# Patient Record
Sex: MALE | Race: WHITE | URBAN - METROPOLITAN AREA
[De-identification: names, ages, dates, MRNs, and addresses within clinical notes are randomized per-mention and may not be internally consistent; named-entity substitution may affect disease eponyms.]

---

## 2017-03-07 ENCOUNTER — OFFICE VISIT (OUTPATIENT)
Dept: INTERNAL MEDICINE CLINIC | Age: 47
End: 2017-03-07

## 2017-03-07 VITALS
RESPIRATION RATE: 14 BRPM | HEIGHT: 71 IN | DIASTOLIC BLOOD PRESSURE: 86 MMHG | OXYGEN SATURATION: 99 % | BODY MASS INDEX: 33.26 KG/M2 | TEMPERATURE: 97.9 F | WEIGHT: 237.6 LBS | HEART RATE: 65 BPM | SYSTOLIC BLOOD PRESSURE: 140 MMHG

## 2017-03-07 DIAGNOSIS — I10 ESSENTIAL HYPERTENSION: Primary | ICD-10-CM

## 2017-03-07 DIAGNOSIS — F98.8 ADD (ATTENTION DEFICIT DISORDER): ICD-10-CM

## 2017-03-07 PROCEDURE — 99214 OFFICE O/P EST MOD 30 MIN: CPT | Performed by: INTERNAL MEDICINE

## 2017-03-07 RX ORDER — DEXTROAMPHETAMINE SACCHARATE, AMPHETAMINE ASPARTATE MONOHYDRATE, DEXTROAMPHETAMINE SULFATE AND AMPHETAMINE SULFATE 6.25; 6.25; 6.25; 6.25 MG/1; MG/1; MG/1; MG/1
25 CAPSULE, EXTENDED RELEASE ORAL EVERY MORNING
Qty: 90 CAPSULE | Refills: 0 | Status: SHIPPED | OUTPATIENT
Start: 2017-03-07 | End: 2017-06-07 | Stop reason: SDUPTHER

## 2017-03-07 ASSESSMENT — ENCOUNTER SYMPTOMS
DIARRHEA: 0
SHORTNESS OF BREATH: 0
COUGH: 0
ABDOMINAL PAIN: 0
WHEEZING: 0
CONSTIPATION: 0

## 2017-06-07 ENCOUNTER — OFFICE VISIT (OUTPATIENT)
Dept: INTERNAL MEDICINE CLINIC | Age: 47
End: 2017-06-07

## 2017-06-07 VITALS
SYSTOLIC BLOOD PRESSURE: 136 MMHG | WEIGHT: 231.6 LBS | HEIGHT: 71 IN | OXYGEN SATURATION: 98 % | DIASTOLIC BLOOD PRESSURE: 82 MMHG | TEMPERATURE: 98 F | HEART RATE: 74 BPM | BODY MASS INDEX: 32.42 KG/M2 | RESPIRATION RATE: 14 BRPM

## 2017-06-07 DIAGNOSIS — F98.8 ADD (ATTENTION DEFICIT DISORDER): Primary | ICD-10-CM

## 2017-06-07 PROCEDURE — 99213 OFFICE O/P EST LOW 20 MIN: CPT | Performed by: INTERNAL MEDICINE

## 2017-06-07 RX ORDER — DEXTROAMPHETAMINE SACCHARATE, AMPHETAMINE ASPARTATE MONOHYDRATE, DEXTROAMPHETAMINE SULFATE AND AMPHETAMINE SULFATE 6.25; 6.25; 6.25; 6.25 MG/1; MG/1; MG/1; MG/1
25 CAPSULE, EXTENDED RELEASE ORAL EVERY MORNING
Qty: 90 CAPSULE | Refills: 0 | Status: SHIPPED | OUTPATIENT
Start: 2017-06-07 | End: 2018-01-15 | Stop reason: SDUPTHER

## 2017-06-07 ASSESSMENT — ENCOUNTER SYMPTOMS
CONSTIPATION: 0
WHEEZING: 0
SHORTNESS OF BREATH: 0
DIARRHEA: 0

## 2017-10-02 ENCOUNTER — TELEPHONE (OUTPATIENT)
Dept: INTERNAL MEDICINE CLINIC | Age: 47
End: 2017-10-02

## 2018-01-15 ENCOUNTER — OFFICE VISIT (OUTPATIENT)
Dept: INTERNAL MEDICINE CLINIC | Age: 48
End: 2018-01-15

## 2018-01-15 VITALS
WEIGHT: 245 LBS | BODY MASS INDEX: 34.3 KG/M2 | TEMPERATURE: 98.1 F | HEIGHT: 71 IN | SYSTOLIC BLOOD PRESSURE: 150 MMHG | DIASTOLIC BLOOD PRESSURE: 92 MMHG | HEART RATE: 76 BPM | OXYGEN SATURATION: 99 %

## 2018-01-15 DIAGNOSIS — R74.8 ELEVATED LIVER ENZYMES: ICD-10-CM

## 2018-01-15 DIAGNOSIS — Z00.00 ANNUAL PHYSICAL EXAM: Primary | ICD-10-CM

## 2018-01-15 DIAGNOSIS — E66.9 OBESITY (BMI 30.0-34.9): ICD-10-CM

## 2018-01-15 DIAGNOSIS — Z12.11 COLON CANCER SCREENING: ICD-10-CM

## 2018-01-15 DIAGNOSIS — Z23 FLU VACCINE NEED: ICD-10-CM

## 2018-01-15 DIAGNOSIS — I10 ESSENTIAL HYPERTENSION: ICD-10-CM

## 2018-01-15 DIAGNOSIS — G47.30 SLEEP APNEA, UNSPECIFIED TYPE: ICD-10-CM

## 2018-01-15 DIAGNOSIS — G56.01 CARPAL TUNNEL SYNDROME, RIGHT: ICD-10-CM

## 2018-01-15 DIAGNOSIS — Z12.5 PROSTATE CANCER SCREENING: ICD-10-CM

## 2018-01-15 DIAGNOSIS — F98.8 ATTENTION DEFICIT DISORDER (ADD) WITHOUT HYPERACTIVITY: ICD-10-CM

## 2018-01-15 PROBLEM — E66.811 OBESITY (BMI 30.0-34.9): Status: ACTIVE | Noted: 2018-01-15

## 2018-01-15 LAB
BILIRUBIN, POC: NORMAL
BLOOD URINE, POC: NORMAL
CLARITY, POC: CLEAR
COLOR, POC: YELLOW
GLUCOSE URINE, POC: NORMAL
KETONES, POC: NORMAL
LEUKOCYTE EST, POC: NORMAL
NITRITE, POC: NORMAL
PH, POC: 7
PROTEIN, POC: NORMAL
SPECIFIC GRAVITY, POC: 1.03
UROBILINOGEN, POC: 0.2

## 2018-01-15 PROCEDURE — 90472 IMMUNIZATION ADMIN EACH ADD: CPT | Performed by: INTERNAL MEDICINE

## 2018-01-15 PROCEDURE — 99396 PREV VISIT EST AGE 40-64: CPT | Performed by: INTERNAL MEDICINE

## 2018-01-15 PROCEDURE — 81002 URINALYSIS NONAUTO W/O SCOPE: CPT | Performed by: INTERNAL MEDICINE

## 2018-01-15 PROCEDURE — 90630 INFLUENZA, QUADV, 18-64 YRS, ID, PF, MICRO INJ, 0.1ML (FLUZONE QUADV, PF): CPT | Performed by: INTERNAL MEDICINE

## 2018-01-15 PROCEDURE — 90715 TDAP VACCINE 7 YRS/> IM: CPT | Performed by: INTERNAL MEDICINE

## 2018-01-15 PROCEDURE — 90471 IMMUNIZATION ADMIN: CPT | Performed by: INTERNAL MEDICINE

## 2018-01-15 RX ORDER — AMLODIPINE BESYLATE 5 MG/1
5 TABLET ORAL DAILY
Qty: 90 TABLET | Refills: 1 | Status: SHIPPED | OUTPATIENT
Start: 2018-01-15 | End: 2018-03-21

## 2018-01-15 RX ORDER — DEXTROAMPHETAMINE SACCHARATE, AMPHETAMINE ASPARTATE MONOHYDRATE, DEXTROAMPHETAMINE SULFATE AND AMPHETAMINE SULFATE 6.25; 6.25; 6.25; 6.25 MG/1; MG/1; MG/1; MG/1
25 CAPSULE, EXTENDED RELEASE ORAL EVERY MORNING
Qty: 90 CAPSULE | Refills: 0 | Status: SHIPPED | OUTPATIENT
Start: 2018-01-15 | End: 2018-04-18 | Stop reason: SDUPTHER

## 2018-01-15 RX ORDER — DEXTROAMPHETAMINE SACCHARATE, AMPHETAMINE ASPARTATE MONOHYDRATE, DEXTROAMPHETAMINE SULFATE AND AMPHETAMINE SULFATE 6.25; 6.25; 6.25; 6.25 MG/1; MG/1; MG/1; MG/1
25 CAPSULE, EXTENDED RELEASE ORAL EVERY MORNING
Qty: 90 CAPSULE | Refills: 0 | Status: SHIPPED | OUTPATIENT
Start: 2018-01-15 | End: 2018-01-15 | Stop reason: SDUPTHER

## 2018-01-15 ASSESSMENT — PATIENT HEALTH QUESTIONNAIRE - PHQ9
SUM OF ALL RESPONSES TO PHQ QUESTIONS 1-9: 0
1. LITTLE INTEREST OR PLEASURE IN DOING THINGS: 0
SUM OF ALL RESPONSES TO PHQ9 QUESTIONS 1 & 2: 0
2. FEELING DOWN, DEPRESSED OR HOPELESS: 0

## 2018-01-15 ASSESSMENT — ENCOUNTER SYMPTOMS
ABDOMINAL PAIN: 0
CONSTIPATION: 0
CHEST TIGHTNESS: 0
NAUSEA: 0
BACK PAIN: 0
SHORTNESS OF BREATH: 0
VOICE CHANGE: 0
VOMITING: 0
SINUS PRESSURE: 0
RHINORRHEA: 1
DIARRHEA: 0
ABDOMINAL DISTENTION: 0
EYE PAIN: 0
TROUBLE SWALLOWING: 0
COLOR CHANGE: 0
WHEEZING: 0
APNEA: 0
COUGH: 0

## 2018-01-15 NOTE — PROGRESS NOTES
Subjective:      Patient ID: Harleen Garibay is a 50 y.o. male. HPI     Chief Complaint   Patient presents with    Annual Exam      Drainage and sinus x 1 week or so  No fever/chills  No dyspnea  When singing deep breath will make him cough    Last ate 1.5 hours ago    Weight gain noted and discussed  Not exercising, not eating correctly  Wife had chemotherapy for breast cancer (pos nodes), over, just had surgery and then radiation following it    Not taken adderall for a few months  Takes the amlodipine intermittently - last rx was  for a 180 day supply      Current Outpatient Prescriptions   Medication Sig Dispense Refill    amphetamine-dextroamphetamine (ADDERALL XR) 25 MG extended release capsule Take 1 capsule by mouth every morning . 90 capsule 0    amLODIPine (NORVASC) 5 MG tablet Take 1 tablet by mouth daily 90 tablet 1     No current facility-administered medications for this visit. Allergies:  No Known Allergies  Prior Medical History:       Diagnosis Date    Family history of ischemic heart disease     Seizures ([address]     5 years ago, no recurrence     Social History  Social History   Substance Use Topics    Smoking status: Former Smoker     Quit date: 11/18/2011    Smokeless tobacco: Never Used    Alcohol use No     Prior Surgical History:  History reviewed. No pertinent surgical history. Family History:       Problem Relation Age of Onset    Cancer Mother      breast    Diabetes Mother     Stroke Mother     Heart Disease Father      MI at age 39         Review of Systems   Constitutional: Negative for activity change, appetite change, chills, fatigue, fever and unexpected weight change. HENT: Positive for congestion, postnasal drip and rhinorrhea. Negative for dental problem, hearing loss, sinus pressure, tinnitus, trouble swallowing and voice change. Eyes: Negative for pain and visual disturbance.         Last eye exam within the last year   Respiratory: Negative for

## 2018-01-16 ENCOUNTER — TELEPHONE (OUTPATIENT)
Dept: INTERNAL MEDICINE CLINIC | Age: 48
End: 2018-01-16

## 2018-01-16 LAB
A/G RATIO: 1.9 (ref 1.1–2.2)
ALBUMIN SERPL-MCNC: 4.4 G/DL (ref 3.4–5)
ALP BLD-CCNC: 45 U/L (ref 40–129)
ALT SERPL-CCNC: 41 U/L (ref 10–40)
ANION GAP SERPL CALCULATED.3IONS-SCNC: 15 MMOL/L (ref 3–16)
AST SERPL-CCNC: 24 U/L (ref 15–37)
BILIRUB SERPL-MCNC: <0.2 MG/DL (ref 0–1)
BUN BLDV-MCNC: 22 MG/DL (ref 7–20)
CALCIUM SERPL-MCNC: 9.3 MG/DL (ref 8.3–10.6)
CHLORIDE BLD-SCNC: 105 MMOL/L (ref 99–110)
CHOLESTEROL, TOTAL: 161 MG/DL (ref 0–199)
CO2: 25 MMOL/L (ref 21–32)
CREAT SERPL-MCNC: 0.8 MG/DL (ref 0.9–1.3)
GFR AFRICAN AMERICAN: >60
GFR NON-AFRICAN AMERICAN: >60
GLOBULIN: 2.3 G/DL
GLUCOSE BLD-MCNC: 98 MG/DL (ref 70–99)
HDLC SERPL-MCNC: 51 MG/DL (ref 40–60)
LDL CHOLESTEROL CALCULATED: 78 MG/DL
POTASSIUM SERPL-SCNC: 4.2 MMOL/L (ref 3.5–5.1)
PROSTATE SPECIFIC ANTIGEN: 0.56 NG/ML (ref 0–4)
SODIUM BLD-SCNC: 145 MMOL/L (ref 136–145)
TOTAL PROTEIN: 6.7 G/DL (ref 6.4–8.2)
TRIGL SERPL-MCNC: 158 MG/DL (ref 0–150)
VLDLC SERPL CALC-MCNC: 32 MG/DL

## 2018-04-18 ENCOUNTER — OFFICE VISIT (OUTPATIENT)
Dept: INTERNAL MEDICINE CLINIC | Age: 48
End: 2018-04-18

## 2018-04-18 VITALS
DIASTOLIC BLOOD PRESSURE: 84 MMHG | HEIGHT: 69 IN | WEIGHT: 234 LBS | HEART RATE: 90 BPM | SYSTOLIC BLOOD PRESSURE: 134 MMHG | BODY MASS INDEX: 34.66 KG/M2 | OXYGEN SATURATION: 98 %

## 2018-04-18 DIAGNOSIS — I10 ESSENTIAL HYPERTENSION: Primary | ICD-10-CM

## 2018-04-18 DIAGNOSIS — R74.8 ELEVATED LIVER ENZYMES: ICD-10-CM

## 2018-04-18 DIAGNOSIS — F98.8 ATTENTION DEFICIT DISORDER (ADD) WITHOUT HYPERACTIVITY: ICD-10-CM

## 2018-04-18 PROCEDURE — 99214 OFFICE O/P EST MOD 30 MIN: CPT | Performed by: INTERNAL MEDICINE

## 2018-04-18 RX ORDER — DEXTROAMPHETAMINE SACCHARATE, AMPHETAMINE ASPARTATE MONOHYDRATE, DEXTROAMPHETAMINE SULFATE AND AMPHETAMINE SULFATE 6.25; 6.25; 6.25; 6.25 MG/1; MG/1; MG/1; MG/1
25 CAPSULE, EXTENDED RELEASE ORAL EVERY MORNING
Qty: 90 CAPSULE | Refills: 0 | Status: SHIPPED | OUTPATIENT
Start: 2018-04-18 | End: 2018-07-12 | Stop reason: SDUPTHER

## 2018-04-18 ASSESSMENT — ENCOUNTER SYMPTOMS
COUGH: 0
SHORTNESS OF BREATH: 0
NAUSEA: 0
VOMITING: 0
WHEEZING: 0

## 2018-04-21 LAB
6-ACETYLMORPHINE: NOT DETECTED
7-AMINOCLONAZEPAM: NOT DETECTED
ALPHA-OH-ALPRAZOLAM: NOT DETECTED
ALPRAZOLAM: NOT DETECTED
AMPHETAMINE: PRESENT
BARBITURATES: NOT DETECTED
BENZOYLECGONINE: NOT DETECTED
BUPRENORPHINE: NOT DETECTED
CARISOPRODOL: NOT DETECTED
CLONAZEPAM: NOT DETECTED
CODEINE: NOT DETECTED
CREATININE URINE: 47.2 MG/DL (ref 20–400)
DIAZEPAM: NOT DETECTED
DRUGS EXPECTED: NORMAL
EER PAIN MGT DRUG PANEL, HIGH RES/EMIT U: NORMAL
ETHYL GLUCURONIDE: NOT DETECTED
FENTANYL: NOT DETECTED
HYDROCODONE: NOT DETECTED
HYDROMORPHONE: NOT DETECTED
LORAZEPAM: NOT DETECTED
MARIJUANA METABOLITE: NOT DETECTED
MDA: NOT DETECTED
MDEA: NOT DETECTED
MDMA URINE: NOT DETECTED
MEPERIDINE: NOT DETECTED
METHADONE: NOT DETECTED
METHAMPHETAMINE: NOT DETECTED
METHYLPHENIDATE: NOT DETECTED
MIDAZOLAM: NOT DETECTED
MORPHINE: NOT DETECTED
NORBUPRENORPHINE, FREE: NOT DETECTED
NORDIAZEPAM: NOT DETECTED
NORFENTANYL: NOT DETECTED
NORHYDROCODONE, URINE: NOT DETECTED
NOROXYCODONE: NOT DETECTED
NOROXYMORPHONE, URINE: NOT DETECTED
OXAZEPAM: NOT DETECTED
OXYCODONE: NOT DETECTED
OXYMORPHONE: NOT DETECTED
PAIN MANAGEMENT DRUG PANEL: NORMAL
PAIN MANAGEMENT DRUG PANEL: NORMAL
PCP: NOT DETECTED
PHENTERMINE: NOT DETECTED
PROPOXYPHENE: NOT DETECTED
TAPENTADOL, URINE: NOT DETECTED
TAPENTADOL-O-SULFATE, URINE: NOT DETECTED
TEMAZEPAM: NOT DETECTED
TRAMADOL: NOT DETECTED
ZOLPIDEM: NOT DETECTED

## 2018-07-03 ENCOUNTER — OFFICE VISIT (OUTPATIENT)
Dept: INTERNAL MEDICINE CLINIC | Age: 48
End: 2018-07-03

## 2018-07-03 VITALS
TEMPERATURE: 98.1 F | OXYGEN SATURATION: 98 % | WEIGHT: 232.2 LBS | DIASTOLIC BLOOD PRESSURE: 80 MMHG | SYSTOLIC BLOOD PRESSURE: 124 MMHG | HEART RATE: 72 BPM | BODY MASS INDEX: 34.29 KG/M2

## 2018-07-03 DIAGNOSIS — Z12.11 COLON CANCER SCREENING: ICD-10-CM

## 2018-07-03 DIAGNOSIS — I10 ESSENTIAL HYPERTENSION: ICD-10-CM

## 2018-07-03 DIAGNOSIS — E66.9 OBESITY (BMI 30.0-34.9): ICD-10-CM

## 2018-07-03 DIAGNOSIS — F98.8 ATTENTION DEFICIT DISORDER (ADD) WITHOUT HYPERACTIVITY: Primary | ICD-10-CM

## 2018-07-03 PROCEDURE — 99214 OFFICE O/P EST MOD 30 MIN: CPT | Performed by: INTERNAL MEDICINE

## 2018-07-03 ASSESSMENT — ENCOUNTER SYMPTOMS
WHEEZING: 0
CONSTIPATION: 0
DIARRHEA: 0
SHORTNESS OF BREATH: 0
ABDOMINAL PAIN: 0
COUGH: 0

## 2018-07-12 ENCOUNTER — TELEPHONE (OUTPATIENT)
Dept: INTERNAL MEDICINE CLINIC | Age: 48
End: 2018-07-12

## 2018-07-12 DIAGNOSIS — F98.8 ATTENTION DEFICIT DISORDER (ADD) WITHOUT HYPERACTIVITY: Primary | ICD-10-CM

## 2018-07-12 RX ORDER — DEXTROAMPHETAMINE SACCHARATE, AMPHETAMINE ASPARTATE MONOHYDRATE, DEXTROAMPHETAMINE SULFATE AND AMPHETAMINE SULFATE 6.25; 6.25; 6.25; 6.25 MG/1; MG/1; MG/1; MG/1
25 CAPSULE, EXTENDED RELEASE ORAL EVERY MORNING
Qty: 90 CAPSULE | Refills: 0 | Status: SHIPPED | OUTPATIENT
Start: 2018-07-12 | End: 2018-10-20

## 2018-07-12 RX ORDER — DEXTROAMPHETAMINE SACCHARATE, AMPHETAMINE ASPARTATE MONOHYDRATE, DEXTROAMPHETAMINE SULFATE AND AMPHETAMINE SULFATE 6.25; 6.25; 6.25; 6.25 MG/1; MG/1; MG/1; MG/1
25 CAPSULE, EXTENDED RELEASE ORAL EVERY MORNING
Qty: 90 CAPSULE | Refills: 0 | Status: SHIPPED | OUTPATIENT
Start: 2018-07-12 | End: 2018-07-12 | Stop reason: SDUPTHER

## 2018-07-12 NOTE — TELEPHONE ENCOUNTER
Patient needs a refill for adderall 25 extended release for 90 days sent to his mail a way   [phone]

## 2023-05-08 ENCOUNTER — OFFICE VISIT (OUTPATIENT)
Dept: CARDIOLOGY CLINIC | Age: 53
End: 2023-05-08

## 2023-05-08 VITALS
WEIGHT: 268 LBS | HEART RATE: 62 BPM | SYSTOLIC BLOOD PRESSURE: 128 MMHG | DIASTOLIC BLOOD PRESSURE: 72 MMHG | BODY MASS INDEX: 39.69 KG/M2 | HEIGHT: 69 IN

## 2023-05-08 DIAGNOSIS — Z00.00 ROUTINE CHECK-UP: Primary | ICD-10-CM

## 2023-05-08 DIAGNOSIS — R06.02 SOB (SHORTNESS OF BREATH): ICD-10-CM

## 2023-05-08 DIAGNOSIS — R07.9 CHEST PAIN, UNSPECIFIED TYPE: ICD-10-CM

## 2023-05-08 RX ORDER — AMLODIPINE BESYLATE 10 MG/1
10 TABLET ORAL DAILY
COMMUNITY
Start: 2023-04-20

## 2023-05-08 RX ORDER — ERGOCALCIFEROL 1.25 MG/1
50000 CAPSULE ORAL WEEKLY
COMMUNITY
Start: 2022-02-18

## 2023-05-08 NOTE — PROGRESS NOTES
enlarged  LUNGS:  Clear to auscultation and percussion  HEART and VASCULAR:  The apical impulses not displaced  Heart tones are crisp and normal  Cervical veins are not engorged  The carotid upstroke is normal in amplitude and contour without delay or bruit  Peripheral pulses are symmetrical and full  There is no clubbing, cyanosis of the extremities. No peripheral edema  Femoral Arteries: 2+ and equal  Pedal Pulses: 2+ and equal   ABDOMEN[de-identified]  No masses or tenderness  Liver/Spleen: No Abnormalities Noted  NEUROLOGICAL:  . Moves all extremities to command. Cranial nerves 2-12 are in tact. PSYCHIATRIC: alert and lucid  and oriented and appropriate  SKIN: No lesions or rashes  LYMPH NODES: none enlarged            CBC with Differential:    Lab Results   Component Value Date/Time    WBC 8.4 11/18/2010 03:15 PM    RBC 4.52 11/18/2010 03:15 PM    HGB 15.4 11/18/2010 03:15 PM    HCT 44.7 11/18/2010 03:15 PM     11/18/2010 03:15 PM    MCV 99.0 11/18/2010 03:15 PM    MCH 34.2 11/18/2010 03:15 PM    MCHC 34.6 11/18/2010 03:15 PM    RDW 13.9 11/18/2010 03:15 PM     BMP:    Lab Results   Component Value Date/Time     01/15/2018 02:59 PM    K 4.2 01/15/2018 02:59 PM     01/15/2018 02:59 PM    CO2 25 01/15/2018 02:59 PM    BUN 22 01/15/2018 02:59 PM    LABALBU 4.4 01/15/2018 02:59 PM    CREATININE 0.8 01/15/2018 02:59 PM    CALCIUM 9.3 01/15/2018 02:59 PM    GFRAA >60 01/15/2018 02:59 PM    GFRAA >60 11/27/2012 12:01 PM    LABGLOM >60 01/15/2018 02:59 PM     Uric Acid:  No components found for: URIC  PT/INR:  No results found for: PROTIME, INR  Last 3 Troponin:  No results found for: TROPONINI  FLP:    Lab Results   Component Value Date/Time    TRIG 158 01/15/2018 02:59 PM    HDL 51 01/15/2018 02:59 PM    HDL 61 11/18/2010 03:15 PM    LDLCALC 78 01/15/2018 02:59 PM    LABVLDL 32 01/15/2018 02:59 PM       EKG: EKG on 5/8/2023 sinus rhythm 75. Nonspecific ST and T wave changes. Hollowayjennifer Barry   echocardiogram  This patient

## 2023-05-22 ENCOUNTER — HOSPITAL ENCOUNTER (OUTPATIENT)
Dept: CT IMAGING | Age: 53
Discharge: HOME | End: 2023-05-22
Payer: COMMERCIAL

## 2023-05-22 VITALS — HEART RATE: 62 BPM | SYSTOLIC BLOOD PRESSURE: 164 MMHG | DIASTOLIC BLOOD PRESSURE: 87 MMHG

## 2023-05-22 DIAGNOSIS — R07.9 CHEST PAIN, UNSPECIFIED TYPE: ICD-10-CM

## 2023-05-22 DIAGNOSIS — R06.02 SOB (SHORTNESS OF BREATH): ICD-10-CM

## 2023-05-22 PROCEDURE — 75574 CT ANGIO HRT W/3D IMAGE: CPT

## 2023-05-22 PROCEDURE — 6360000004 HC RX CONTRAST MEDICATION: Performed by: INTERNAL MEDICINE

## 2023-05-22 RX ADMIN — IOPAMIDOL 80 ML: 755 INJECTION, SOLUTION INTRAVENOUS at 12:51

## 2023-05-22 NOTE — FLOWSHEET NOTE
Patient arrived alert and orientated x 4, ambulatory, denies pain. CTA Coronary study done without complication. Patient given  1  SL Nitro. See flow sheets for VS.  Patient has small emesis after contrast. Patient states nausea subsided, had snack and drink. No rash, or swelling, denies throat swelling or irritation. Patient was d/cd from CT. No Metoprolol was given.

## 2023-05-22 NOTE — DISCHARGE INSTRUCTIONS
[facility] for your medical care. During your examination, you were given a Beta Blocker called Metopropol or Lopressor to help slow down your heart rate. The dose of the medication you were given was n/a and 1 sublingual nitroglycerin. Although there are few side effects from this medication when given in small amounts (doses) it is important you follow a few important instructions:    Notify the doctor that ordered your test or go to the nearest emergency room if you experience any of the following:  difficulty breathing  dizziness  extreme fatigue  pounding or irregular heart beat    Continue your usual diet, increase fluids today. (Four 8 ounce glasses of water). 4.You may return back to your normal activity and routine tomorrow. Test results will be sent to your Physician.     If you take Actoplus Met, Avandamet, Glucophage, Glucophage XR, Glucovance, Metformin, Metaglip, Riomet, or Fortmet hold medication for 48 hours after procedure and resume

## 2023-05-24 RX ORDER — AMLODIPINE BESYLATE 10 MG/1
10 TABLET ORAL DAILY
Qty: 90 TABLET | Refills: 3 | Status: SHIPPED | OUTPATIENT
Start: 2023-05-24 | End: 2023-05-25 | Stop reason: SDUPTHER

## 2023-05-25 ENCOUNTER — OFFICE VISIT (OUTPATIENT)
Dept: CARDIOLOGY CLINIC | Age: 53
End: 2023-05-25
Payer: COMMERCIAL

## 2023-05-25 VITALS
BODY MASS INDEX: 39.69 KG/M2 | WEIGHT: 268 LBS | HEIGHT: 69 IN | DIASTOLIC BLOOD PRESSURE: 80 MMHG | SYSTOLIC BLOOD PRESSURE: 128 MMHG | HEART RATE: 60 BPM

## 2023-05-25 DIAGNOSIS — I10 ESSENTIAL HYPERTENSION: ICD-10-CM

## 2023-05-25 DIAGNOSIS — E78.5 HYPERLIPIDEMIA LDL GOAL <70: Primary | ICD-10-CM

## 2023-05-25 PROCEDURE — 3079F DIAST BP 80-89 MM HG: CPT | Performed by: INTERNAL MEDICINE

## 2023-05-25 PROCEDURE — 99214 OFFICE O/P EST MOD 30 MIN: CPT | Performed by: INTERNAL MEDICINE

## 2023-05-25 PROCEDURE — 3074F SYST BP LT 130 MM HG: CPT | Performed by: INTERNAL MEDICINE

## 2023-05-25 RX ORDER — AMLODIPINE BESYLATE 10 MG/1
10 TABLET ORAL DAILY
Qty: 90 TABLET | Refills: 3 | Status: SHIPPED | OUTPATIENT
Start: 2023-05-25

## 2023-05-25 RX ORDER — AMLODIPINE BESYLATE 10 MG/1
10 TABLET ORAL DAILY
Qty: 14 TABLET | Refills: 0 | Status: SHIPPED | OUTPATIENT
Start: 2023-05-25

## 2023-05-25 NOTE — PROGRESS NOTES
[facility]   Dr Kurtis Patel. Mary CLEARY, [facility]    Outpatient Follow Up Note    2023,11:24 AM  Subjective:   CHIEF COMPLAINT / HPI:  Follow Up secondary to hypertension and hyperlipidemia     Pascual Goodson is 52 y.o. male who presents today for a routine follow up. Here today on 2023 for evaluation follow-up of the above. Also for evaluation of lower laboratory assessments. He not having any chest pains in fact feels quite good. No other issues and no dyspnea and no peripheral edema. He did have labs performed which include an A1c of 4.8 and BUN of 23. LDL was 94 PSA 0.6. Today's examination is stable with blood pressure 120/80 pulse of 60 he weighs in at 268 pounds. We did perform a CT angiogram of his coronary CT was negative showed no evidence for coronary artery disease and the calcium level was 0      Coronary CT angiogram 2023 was negative with normal coronaries. Hypertension  Hyperlipidemia with LDL of 94 on current therapy     Past Medical History:    Past Medical History:   Diagnosis Date    Family history of ischemic heart disease     Seizures ([address]     5 years ago, no recurrence     Past Surgical History  No past surgical history on file. Social History:       Social History     Tobacco Use   Smoking Status Former    Types: Cigarettes    Quit date: 2011    Years since quittin.5   Smokeless Tobacco Never     Current Medications:  Prior to Visit Medications    Medication Sig Taking?  Authorizing Provider   amLODIPine (NORVASC) 10 MG tablet Take 1 tablet by mouth daily Yes Ayesha Montejo, APRN - CNP   sertraline (ZOLOFT) 50 MG tablet Take 1.5 tablets by mouth daily Yes Historical Provider, MD   ergocalciferol (ERGOCALCIFEROL) 1.25 MG (68520 UT) capsule Take 1 capsule by mouth once a week Yes Historical Provider, MD   amphetamine-dextroamphetamine (ADDERALL XR) 25 MG extended release capsule Take 1 capsule by mouth every morning for 100

## 2023-11-29 RX ORDER — AMLODIPINE BESYLATE 10 MG/1
10 TABLET ORAL DAILY
Qty: 90 TABLET | Refills: 1 | Status: CANCELLED | OUTPATIENT
Start: 2023-11-29

## 2023-11-29 NOTE — TELEPHONE ENCOUNTER
Patient's wife called and stated the patient has put in a request for medication through [facility]. She said the pharmacy stated they haven't received anything.   Call back to inform when medication has been sent [phone]

## 2023-11-29 NOTE — TELEPHONE ENCOUNTER
I Medication Refills:    amLODIPine (NORVASC) 10 MG tablet [[id number]]    Order Details  Dose: 10 mg Route: Oral Frequency: DAILY   Dispense Quantity: 90 tablet Refills: 3          Sig: Take 1 tablet by mouth daily   Patient has enough for 7 days.      Last Office Visit: 5/25/23    Next Office Visit: 5/22/24    [facility] [address] [place] [address],[address] [phone] - F [phone]  [address] [place] [address],[address] [place] [address], [address] [place] [address]  Phone: [phone]  Fax: [phone]

## 2023-12-01 RX ORDER — AMLODIPINE BESYLATE 10 MG/1
10 TABLET ORAL DAILY
Qty: 90 TABLET | Refills: 3 | Status: SHIPPED | OUTPATIENT
Start: 2023-12-01

## 2024-05-20 RX ORDER — AMLODIPINE BESYLATE 10 MG/1
10 TABLET ORAL DAILY
Qty: 14 TABLET | Refills: 0 | Status: SHIPPED | OUTPATIENT
Start: 2024-05-20

## 2024-05-20 NOTE — TELEPHONE ENCOUNTER
I Medication Refills:  amLODIPine (NORVASC) 10 MG tablet [[id number]]    Order Details  Dose: 10 mg Route: Oral Frequency: DAILY   Dispense Quantity: 90 tablet Refills: 3          Sig: Take 1 tablet by mouth daily     He has enough to get through to the next appt but will need it before because of the home delivery. He said you could also write him a script when he gets to the office visit and he can take it to a different pharmacy.    Last Office Visit: 5/25/23    Next Office Visit: 5/29/24    [facility] - [address] [phone] - F [phone] [[id number]]

## 2024-05-29 ENCOUNTER — OFFICE VISIT (OUTPATIENT)
Dept: CARDIOLOGY CLINIC | Age: 54
End: 2024-05-29
Payer: COMMERCIAL

## 2024-05-29 VITALS
HEIGHT: 69 IN | SYSTOLIC BLOOD PRESSURE: 132 MMHG | BODY MASS INDEX: 39.25 KG/M2 | WEIGHT: 265 LBS | DIASTOLIC BLOOD PRESSURE: 80 MMHG | HEART RATE: 103 BPM

## 2024-05-29 DIAGNOSIS — I10 ESSENTIAL HYPERTENSION: Primary | ICD-10-CM

## 2024-05-29 DIAGNOSIS — R06.02 SHORTNESS OF BREATH: ICD-10-CM

## 2024-05-29 DIAGNOSIS — I48.91 ATRIAL FIBRILLATION, UNSPECIFIED TYPE (HCC): ICD-10-CM

## 2024-05-29 DIAGNOSIS — I48.0 PAROXYSMAL ATRIAL FIBRILLATION (HCC): ICD-10-CM

## 2024-05-29 PROCEDURE — 3075F SYST BP GE 130 - 139MM HG: CPT | Performed by: NURSE PRACTITIONER

## 2024-05-29 PROCEDURE — 99215 OFFICE O/P EST HI 40 MIN: CPT | Performed by: NURSE PRACTITIONER

## 2024-05-29 PROCEDURE — 3079F DIAST BP 80-89 MM HG: CPT | Performed by: NURSE PRACTITIONER

## 2024-05-29 PROCEDURE — 93000 ELECTROCARDIOGRAM COMPLETE: CPT | Performed by: NURSE PRACTITIONER

## 2024-05-29 RX ORDER — DEXTROAMPHETAMINE SACCHARATE, AMPHETAMINE ASPARTATE, DEXTROAMPHETAMINE SULFATE AND AMPHETAMINE SULFATE 5; 5; 5; 5 MG/1; MG/1; MG/1; MG/1
20 TABLET ORAL DAILY
COMMUNITY

## 2024-05-29 NOTE — PROGRESS NOTES
Component Value Date    CREATININE 0.8 (L) 01/15/2018    BUN 22 (H) 01/15/2018     01/15/2018    K 4.2 01/15/2018     01/15/2018    CO2 25 01/15/2018     Lab Results   Component Value Date    TSH 0.79 11/18/2010     Lab Results   Component Value Date    WBC 8.4 11/18/2010    HGB 15.4 11/18/2010    HCT 44.7 11/18/2010    MCV 99.0 11/18/2010     11/18/2010     Lab Results   Component Value Date    TRIG 158 (H) 01/15/2018    TRIG 100 07/19/2016    TRIG 71 01/13/2016     Lab Results   Component Value Date    HDL 51 01/15/2018    HDL 65 (H) 07/19/2016    HDL 55 01/13/2016       Radiology Review:  Pertinent images / reports were reviewed as a part of this visit and reveals the following:    Assessment:     New found Afib ?   EKG rate 102   [id number] Score for Atrial Fibrillation Stroke Risk 1     5/22/23  1. Total calcium score 0.  2. Right coronary artery dominance.  3. Normal coronary arteries with no stenosis or significant atherosclerotic plaque.    HTN  WNL     Obesity  Body mass index is 39.13 kg/m².  Discussed diet and activity    ADD   On adderall Zoloft      YANI    wears CPAP     Plan:   VSS wt stable   EKG  afib (last two EKG  were NSR) new afib??   He states he has had irregular HR not sure what it was for years but never diagnosed    States drinks great a amt caffeine drinks / energy drinks   Does a great deal of decongestants recommend to use antihistamine   Discussed dehydration, alcohol, decongestant, stress, energy drinks  / recommend water   States YANI and wears CPAP   TTE /   new found afib        Coronary CT angiogram 5/2023 was negative with normal coronaries  YANI wears CPAP  Fu in 3-4 weeks   Discussed with Dr Ramirez:   Start metoprolol 25 mg BID   Eliquis 5mg BID   Fu iin approx 3 weeks   Wants to see Dr Hernandez next visit   May need DCCV in future    If you haven't had a visit with Dr. Hernandez in a year please make your next visit with him.         Lazara Condon

## 2024-06-04 ENCOUNTER — HOSPITAL ENCOUNTER (OUTPATIENT)
Age: 54
Discharge: HOME | End: 2024-06-06
Payer: COMMERCIAL

## 2024-06-04 VITALS
SYSTOLIC BLOOD PRESSURE: 132 MMHG | HEIGHT: 69 IN | DIASTOLIC BLOOD PRESSURE: 80 MMHG | BODY MASS INDEX: 39.25 KG/M2 | WEIGHT: 265 LBS

## 2024-06-04 DIAGNOSIS — R06.02 SHORTNESS OF BREATH: ICD-10-CM

## 2024-06-04 DIAGNOSIS — I48.91 ATRIAL FIBRILLATION, UNSPECIFIED TYPE (HCC): ICD-10-CM

## 2024-06-04 DIAGNOSIS — I48.0 PAROXYSMAL ATRIAL FIBRILLATION (HCC): ICD-10-CM

## 2024-06-04 DIAGNOSIS — I10 ESSENTIAL HYPERTENSION: ICD-10-CM

## 2024-06-04 LAB
ECHO AV MEAN GRADIENT: 5 MMHG
ECHO AV MEAN VELOCITY: 1.1 M/S
ECHO AV PEAK GRADIENT: 8 MMHG
ECHO AV PEAK VELOCITY: 1.4 M/S
ECHO AV VELOCITY RATIO: 0.79
ECHO AV VTI: 32.8 CM
ECHO BSA: 2.42 M2
ECHO LA AREA 2C: 23.6 CM2
ECHO LA AREA 4C: 22.7 CM2
ECHO LA MAJOR AXIS: 5.6 CM
ECHO LA MINOR AXIS: 5.6 CM
ECHO LA VOL BP: 77 ML (ref 18–58)
ECHO LA VOL MOD A2C: 80 ML (ref 18–58)
ECHO LA VOL MOD A4C: 74 ML (ref 18–58)
ECHO LA VOL/BSA BIPLANE: 33 ML/M2 (ref 16–34)
ECHO LA VOLUME INDEX MOD A2C: 34 ML/M2 (ref 16–34)
ECHO LA VOLUME INDEX MOD A4C: 32 ML/M2 (ref 16–34)
ECHO LV E' LATERAL VELOCITY: 10 CM/S
ECHO LV E' SEPTAL VELOCITY: 7 CM/S
ECHO LV FRACTIONAL SHORTENING: 29 % (ref 28–44)
ECHO LV INTERNAL DIMENSION DIASTOLE INDEX: 2.1 CM/M2
ECHO LV INTERNAL DIMENSION DIASTOLIC: 4.9 CM (ref 4.2–5.9)
ECHO LV INTERNAL DIMENSION SYSTOLIC INDEX: 1.5 CM/M2
ECHO LV INTERNAL DIMENSION SYSTOLIC: 3.5 CM
ECHO LV IVSD: 1 CM (ref 0.6–1)
ECHO LV MASS 2D: 176 G (ref 88–224)
ECHO LV MASS INDEX 2D: 75.6 G/M2 (ref 49–115)
ECHO LV POSTERIOR WALL DIASTOLIC: 1 CM (ref 0.6–1)
ECHO LV RELATIVE WALL THICKNESS RATIO: 0.41
ECHO LVOT AV VTI INDEX: 0.77
ECHO LVOT MEAN GRADIENT: 3 MMHG
ECHO LVOT PEAK GRADIENT: 4 MMHG
ECHO LVOT PEAK VELOCITY: 1.1 M/S
ECHO LVOT VTI: 25.4 CM
ECHO MV A VELOCITY: 0.54 M/S
ECHO MV E VELOCITY: 0.73 M/S
ECHO MV E/A RATIO: 1.35
ECHO MV E/E' LATERAL: 7.3
ECHO MV E/E' RATIO (AVERAGED): 8.86
ECHO MV E/E' SEPTAL: 10.43
ECHO MV LVOT VTI INDEX: 1.05
ECHO MV MAX VELOCITY: 0.9 M/S
ECHO MV MEAN GRADIENT: 1 MMHG
ECHO MV MEAN VELOCITY: 0.5 M/S
ECHO MV PEAK GRADIENT: 3 MMHG
ECHO MV VTI: 26.7 CM
ECHO PV MAX VELOCITY: 1.1 M/S
ECHO PV MEAN GRADIENT: 2 MMHG
ECHO PV MEAN VELOCITY: 0.7 M/S
ECHO PV PEAK GRADIENT: 5 MMHG
ECHO PV VTI: 19.7 CM
ECHO RV INTERNAL DIMENSION: 3.6 CM
ECHO RV TAPSE: 3.1 CM (ref 1.7–?)

## 2024-06-04 PROCEDURE — 93306 TTE W/DOPPLER COMPLETE: CPT

## 2024-06-05 LAB
25(OH)D3 SERPL-MCNC: 21 NG/ML
ALBUMIN SERPL-MCNC: 4.4 G/DL (ref 3.4–5)
ALBUMIN/GLOB SERPL: 1.6 {RATIO} (ref 1.1–2.2)
ALP SERPL-CCNC: 64 U/L (ref 40–129)
ALT SERPL-CCNC: 57 U/L (ref 10–40)
ANION GAP SERPL CALCULATED.3IONS-SCNC: 14 MMOL/L (ref 3–16)
AST SERPL-CCNC: 27 U/L (ref 15–37)
BILIRUB DIRECT SERPL-MCNC: <0.2 MG/DL (ref 0–0.3)
BILIRUB INDIRECT SERPL-MCNC: ABNORMAL MG/DL (ref 0–1)
BILIRUB SERPL-MCNC: 0.4 MG/DL (ref 0–1)
BUN SERPL-MCNC: 18 MG/DL (ref 7–20)
CALCIUM SERPL-MCNC: 9.4 MG/DL (ref 8.3–10.6)
CHLORIDE SERPL-SCNC: 106 MMOL/L (ref 99–110)
CHOLEST SERPL-MCNC: 194 MG/DL (ref 0–199)
CO2 SERPL-SCNC: 23 MMOL/L (ref 21–32)
CREAT SERPL-MCNC: 0.9 MG/DL (ref 0.9–1.3)
DEPRECATED RDW RBC AUTO: 14 % (ref 12.4–15.4)
EST. AVERAGE GLUCOSE BLD GHB EST-MCNC: 88.2 MG/DL
GFR SERPLBLD CREATININE-BSD FMLA CKD-EPI: >90 ML/MIN/{1.73_M2}
GLUCOSE SERPL-MCNC: 87 MG/DL (ref 70–99)
HBA1C MFR BLD: 4.7 %
HCT VFR BLD AUTO: 43.5 % (ref 40.5–52.5)
HGB BLD-MCNC: 14.8 G/DL (ref 13.5–17.5)
LDLC SERPL CALC-MCNC: 126 MG/DL
MAGNESIUM SERPL-MCNC: 2.3 MG/DL (ref 1.8–2.4)
MCH RBC QN AUTO: 32.6 PG (ref 26–34)
MCHC RBC AUTO-ENTMCNC: 34.1 G/DL (ref 31–36)
PLATELET # BLD AUTO: 206 K/UL (ref 135–450)
PMV BLD AUTO: 9.2 FL (ref 5–10.5)
POTASSIUM SERPL-SCNC: 4.2 MMOL/L (ref 3.5–5.1)
PROT SERPL-MCNC: 7.1 G/DL (ref 6.4–8.2)
RBC # BLD AUTO: 4.55 M/UL (ref 4.2–5.9)
SODIUM SERPL-SCNC: 143 MMOL/L (ref 136–145)
TRIGL SERPL-MCNC: 76 MG/DL (ref 0–150)
TSH SERPL DL<=0.005 MIU/L-ACNC: 1.17 UIU/ML (ref 0.27–4.2)
VLDLC SERPL CALC-MCNC: 15 MG/DL
WBC # BLD AUTO: 8 K/UL (ref 4–11)

## 2024-06-20 ENCOUNTER — OFFICE VISIT (OUTPATIENT)
Dept: CARDIOLOGY CLINIC | Age: 54
End: 2024-06-20
Payer: COMMERCIAL

## 2024-06-20 ENCOUNTER — TELEPHONE (OUTPATIENT)
Dept: CARDIOLOGY CLINIC | Age: 54
End: 2024-06-20

## 2024-06-20 VITALS
HEART RATE: 80 BPM | BODY MASS INDEX: 40.46 KG/M2 | DIASTOLIC BLOOD PRESSURE: 82 MMHG | SYSTOLIC BLOOD PRESSURE: 136 MMHG | WEIGHT: 274 LBS

## 2024-06-20 DIAGNOSIS — I48.91 ATRIAL FIBRILLATION, UNSPECIFIED TYPE (HCC): ICD-10-CM

## 2024-06-20 DIAGNOSIS — E78.5 HYPERLIPIDEMIA LDL GOAL <70: ICD-10-CM

## 2024-06-20 DIAGNOSIS — Z00.00 ROUTINE CHECK-UP: ICD-10-CM

## 2024-06-20 DIAGNOSIS — I10 ESSENTIAL HYPERTENSION: Primary | ICD-10-CM

## 2024-06-20 DIAGNOSIS — I48.0 PAROXYSMAL ATRIAL FIBRILLATION (HCC): ICD-10-CM

## 2024-06-20 PROCEDURE — 3079F DIAST BP 80-89 MM HG: CPT | Performed by: INTERNAL MEDICINE

## 2024-06-20 PROCEDURE — 3075F SYST BP GE 130 - 139MM HG: CPT | Performed by: INTERNAL MEDICINE

## 2024-06-20 PROCEDURE — 99214 OFFICE O/P EST MOD 30 MIN: CPT | Performed by: INTERNAL MEDICINE

## 2024-06-20 PROCEDURE — 93000 ELECTROCARDIOGRAM COMPLETE: CPT | Performed by: INTERNAL MEDICINE

## 2024-06-20 RX ORDER — ATORVASTATIN CALCIUM 40 MG/1
40 TABLET, FILM COATED ORAL DAILY
Qty: 90 TABLET | Refills: 1 | Status: SHIPPED | OUTPATIENT
Start: 2024-06-20

## 2024-06-20 RX ORDER — ATORVASTATIN CALCIUM 40 MG/1
40 TABLET, FILM COATED ORAL DAILY
COMMUNITY

## 2024-06-26 NOTE — TELEPHONE ENCOUNTER
Called and spoke to Theo to let him know that I mailed another holter out to him on June 24,2024.

## 2024-06-26 NOTE — TELEPHONE ENCOUNTER
Patients partner Leena called asking if another monitor could be mailed to their house or if they should schedule another monitor appt since the pt's monitor fell off after wearing it for two days. Can reach Leena at [phone]

## 2024-07-12 NOTE — PRE-PROCEDURE INSTRUCTIONS
Called patient about procedure.Told to be here at 1130 for procedure at 1300. NPO after midnight, but can take morning medication with sips of water, patient stated they take Eliquis and have not missed any doses in the last 30 days. To have a responsible adult be with patient take them home and stay with them afterwards, if they do not get admitted to hosptial. And if available bring current list of medications. No other questions or concerns.

## 2024-07-15 ENCOUNTER — HOSPITAL ENCOUNTER (OUTPATIENT)
Dept: INTERVENTIONAL RADIOLOGY/VASCULAR | Age: 54
Discharge: HOME | End: 2024-07-17
Attending: INTERNAL MEDICINE
Payer: COMMERCIAL

## 2024-07-15 ENCOUNTER — ANESTHESIA (OUTPATIENT)
Dept: INTERVENTIONAL RADIOLOGY/VASCULAR | Age: 54
End: 2024-07-15
Payer: COMMERCIAL

## 2024-07-15 ENCOUNTER — ANESTHESIA EVENT (OUTPATIENT)
Dept: INTERVENTIONAL RADIOLOGY/VASCULAR | Age: 54
End: 2024-07-15
Payer: COMMERCIAL

## 2024-07-15 VITALS
WEIGHT: 270 LBS | HEART RATE: 91 BPM | SYSTOLIC BLOOD PRESSURE: 131 MMHG | OXYGEN SATURATION: 96 % | BODY MASS INDEX: 39.99 KG/M2 | DIASTOLIC BLOOD PRESSURE: 102 MMHG | RESPIRATION RATE: 20 BRPM | HEIGHT: 69 IN | TEMPERATURE: 98.2 F

## 2024-07-15 DIAGNOSIS — I48.91 ATRIAL FIBRILLATION, UNSPECIFIED TYPE (HCC): ICD-10-CM

## 2024-07-15 DIAGNOSIS — I48.0 PAROXYSMAL ATRIAL FIBRILLATION (HCC): ICD-10-CM

## 2024-07-15 LAB
ALBUMIN SERPL-MCNC: 4.1 G/DL (ref 3.4–5)
ALBUMIN/GLOB SERPL: 1.5 {RATIO} (ref 1.1–2.2)
ALP SERPL-CCNC: 65 U/L (ref 40–129)
ALT SERPL-CCNC: 83 U/L (ref 10–40)
ANION GAP SERPL CALCULATED.3IONS-SCNC: 10 MMOL/L (ref 3–16)
AST SERPL-CCNC: 42 U/L (ref 15–37)
BILIRUB SERPL-MCNC: 0.6 MG/DL (ref 0–1)
BUN SERPL-MCNC: 17 MG/DL (ref 7–20)
CALCIUM SERPL-MCNC: 8.8 MG/DL (ref 8.3–10.6)
CHLORIDE SERPL-SCNC: 110 MMOL/L (ref 99–110)
CO2 SERPL-SCNC: 22 MMOL/L (ref 21–32)
CREAT SERPL-MCNC: 1.1 MG/DL (ref 0.9–1.3)
ECHO BSA: 2.44 M2
EKG DIAGNOSIS: NORMAL
EKG DIAGNOSIS: NORMAL
EKG Q-T INTERVAL: 372 MS
EKG Q-T INTERVAL: 384 MS
EKG QRS DURATION: 80 MS
EKG QRS DURATION: 82 MS
EKG QTC CALCULATION (BAZETT): 439 MS
EKG QTC CALCULATION (BAZETT): 459 MS
EKG R AXIS: 15 DEGREES
EKG R AXIS: 35 DEGREES
EKG T AXIS: 37 DEGREES
EKG T AXIS: 43 DEGREES
EKG VENTRICULAR RATE: 84 BPM
EKG VENTRICULAR RATE: 86 BPM
GFR SERPLBLD CREATININE-BSD FMLA CKD-EPI: 79 ML/MIN/{1.73_M2}
GLUCOSE SERPL-MCNC: 105 MG/DL (ref 70–99)
INR PPP: 1.26 (ref 0.85–1.15)
POTASSIUM SERPL-SCNC: 4.4 MMOL/L (ref 3.5–5.1)
PROT SERPL-MCNC: 6.9 G/DL (ref 6.4–8.2)
PROTHROMBIN TIME: 16 SEC (ref 11.9–14.9)
SODIUM SERPL-SCNC: 142 MMOL/L (ref 136–145)

## 2024-07-15 PROCEDURE — 6360000002 HC RX W HCPCS

## 2024-07-15 PROCEDURE — 85610 PROTHROMBIN TIME: CPT

## 2024-07-15 PROCEDURE — 7100000011 HC PHASE II RECOVERY - ADDTL 15 MIN

## 2024-07-15 PROCEDURE — 3700000001 HC ADD 15 MINUTES (ANESTHESIA)

## 2024-07-15 PROCEDURE — 92961 CARDIOVERSION ELECTRIC INT: CPT

## 2024-07-15 PROCEDURE — 7100000010 HC PHASE II RECOVERY - FIRST 15 MIN

## 2024-07-15 PROCEDURE — 80053 COMPREHEN METABOLIC PANEL: CPT

## 2024-07-15 PROCEDURE — 2580000003 HC RX 258

## 2024-07-15 PROCEDURE — 3700000000 HC ANESTHESIA ATTENDED CARE

## 2024-07-15 PROCEDURE — 2500000003 HC RX 250 WO HCPCS

## 2024-07-15 PROCEDURE — 93010 ELECTROCARDIOGRAM REPORT: CPT | Performed by: INTERNAL MEDICINE

## 2024-07-15 PROCEDURE — 92961 CARDIOVERSION ELECTRIC INT: CPT | Performed by: INTERNAL MEDICINE

## 2024-07-15 PROCEDURE — 93005 ELECTROCARDIOGRAM TRACING: CPT | Performed by: INTERNAL MEDICINE

## 2024-07-15 RX ORDER — LIDOCAINE HYDROCHLORIDE 20 MG/ML
INJECTION, SOLUTION INFILTRATION; PERINEURAL PRN
Status: DISCONTINUED | OUTPATIENT
Start: 2024-07-15 | End: 2024-07-15 | Stop reason: SDUPTHER

## 2024-07-15 RX ORDER — PROPOFOL 10 MG/ML
INJECTION, EMULSION INTRAVENOUS PRN
Status: DISCONTINUED | OUTPATIENT
Start: 2024-07-15 | End: 2024-07-15 | Stop reason: SDUPTHER

## 2024-07-15 RX ORDER — SODIUM CHLORIDE, SODIUM LACTATE, POTASSIUM CHLORIDE, CALCIUM CHLORIDE 600; 310; 30; 20 MG/100ML; MG/100ML; MG/100ML; MG/100ML
INJECTION, SOLUTION INTRAVENOUS CONTINUOUS PRN
Status: DISCONTINUED | OUTPATIENT
Start: 2024-07-15 | End: 2024-07-15 | Stop reason: SDUPTHER

## 2024-07-15 RX ADMIN — PROPOFOL 40 MG: 10 INJECTION, EMULSION INTRAVENOUS at 13:26

## 2024-07-15 RX ADMIN — LIDOCAINE HYDROCHLORIDE 100 MG: 20 INJECTION, SOLUTION INFILTRATION; PERINEURAL at 13:26

## 2024-07-15 RX ADMIN — SODIUM CHLORIDE, SODIUM LACTATE, POTASSIUM CHLORIDE, AND CALCIUM CHLORIDE: .6; .31; .03; .02 INJECTION, SOLUTION INTRAVENOUS at 13:19

## 2024-07-15 RX ADMIN — PROPOFOL 40 MG: 10 INJECTION, EMULSION INTRAVENOUS at 13:28

## 2024-07-15 RX ADMIN — PROPOFOL 20 MG: 10 INJECTION, EMULSION INTRAVENOUS at 13:32

## 2024-07-15 RX ADMIN — PROPOFOL 20 MG: 10 INJECTION, EMULSION INTRAVENOUS at 13:30

## 2024-07-15 ASSESSMENT — LIFESTYLE VARIABLES: SMOKING_STATUS: 0

## 2024-07-15 NOTE — ANESTHESIA PRE PROCEDURE
Essential hypertension I10    Sleep apnea G47.30    Elevated liver enzymes R74.8    Attention deficit disorder (ADD) without hyperactivity F98.8    Carpal tunnel syndrome, right G56.01    Obesity (BMI 30.0-34.9) E66.9       Past Medical History:        Diagnosis Date    Family history of ischemic heart disease     Seizures (HCC)     5 years ago, no recurrence       Past Surgical History:  No past surgical history on file.    Social History:    Social History     Tobacco Use    Smoking status: Former     Current packs/day: 0.00     Types: Cigarettes     Quit date: 2011     Years since quittin.6    Smokeless tobacco: Never   Substance Use Topics    Alcohol use: No     Alcohol/week: 4.0 standard drinks of alcohol     Types: 4 Standard drinks or equivalent per week                                Counseling given: Not Answered      Vital Signs (Current): There were no vitals filed for this visit.                                           BP Readings from Last 3 Encounters:   24 136/82   24 132/80   24 132/80       NPO Status:                                                                                 BMI:   Wt Readings from Last 3 Encounters:   24 124.3 kg (274 lb)   24 120.2 kg (265 lb)   24 120.2 kg (265 lb)     There is no height or weight on file to calculate BMI.    CBC:   Lab Results   Component Value Date/Time    WBC 8.0 2024 02:29 PM    RBC 4.55 2024 02:29 PM    HGB 14.8 2024 02:29 PM    HCT 43.5 2024 02:29 PM    MCV 95.6 2024 02:29 PM    RDW 14.0 2024 02:29 PM     2024 02:29 PM       CMP:   Lab Results   Component Value Date/Time     2024 02:29 PM    K 4.2 2024 02:29 PM     2024 02:29 PM    CO2 23 2024 02:29 PM    BUN 18 2024 02:29 PM    CREATININE 0.9 2024 02:29 PM    GFRAA >60 01/15/2018 02:59 PM    GFRAA >60 2012 12:01 PM    AGRATIO 1.6 2024 02:29 PM

## 2024-07-15 NOTE — DISCHARGE INSTRUCTIONS
[facility]  Cardiovascular Special Procedures  Cardioversion  Patient Discharge Instructions      No driving for 24 hours.  We strongly recommend that a responsible adult stay with you for the next 24 hours.    Continue Eliquis as directed by Dr. Hernandez    Hydrocortisone 1% cream to reddened areas as needed.         [facility]  Cardiovascular Special Procedures  General Discharge Instructions    PROCEDURE: Synchronized Cardioversion    _X___ You may be drowsy or lightheaded after receiving sedation. DO NOT operate a vehicle (automobile, bicycle, motorcycle, machinery, or power tools), make any important decisions or sign any important/legal documents, or drink alcoholic beverages for the next 24 hours  _X___ We strongly suggest that a responsible adult be with you for the next 24 hours for your protection and safety  __X__ If the intravenous catheter site is painful, apply warm wet compresses on the site until the soreness is relieved and elevate the arm above the heart.  Call your physician if no improvement in 2 to 3 days    DIETARY INSTRUCTIONS:    ____ Drink extra fluids over the next 24 hours (If not contraindicated by illness or by physician order)  ____ Start with clear liquids and progress to normal diet as you feel like eating.  If you experience nausea or repeated episodes of vomiting, which persist beyond 12-24 hours, notify your doctor        __X__ Resume your previous diet    ACTIVITY INSTRUCTIONS:    ____ See other instructions  ____ No special instructions  __X__ Rest for 24 hours    ____ Up as tolerated  ____ Increase activity as tolerated    Wound/Dressing Instructions:  ____ See other instructions  ____ May shower, tomorrow  ____ Remove bandage within 24 hours    MEDICATION INSTRUCTIONS:    ____ See Medication Reconciliation Sheet      SPECIAL

## 2024-07-15 NOTE — ANESTHESIA POSTPROCEDURE EVALUATION
Department of Anesthesiology  Postprocedure Note    Patient: Theo Tariq  MRN: [id number]  YOB: 1970  Date of evaluation: 7/15/2024    Procedure Summary       Date: 07/15/24 Room / Location: [facility] Special Procedures    Anesthesia Start: 1319 Anesthesia Stop: 1335    Procedure: CARDIOVERSION INTERNAL Diagnosis:       Atrial fibrillation, unspecified type (HCC)      Paroxysmal atrial fibrillation (HCC)      (Afib)    Scheduled Providers: Shyam Hernandez MD; Colt Joseph DO Responsible Provider: Colt Joseph DO    Anesthesia Type: MAC ASA Status: 2            Anesthesia Type: MAC    Malorie Phase I: Malorie Score: 10    Malorie Phase II:      Vitals:    07/15/24 1336   BP: (!) 156/95   Pulse:    Resp:    Temp:    SpO2:        Anesthesia Post Evaluation    Patient location during evaluation: bedside  Patient participation: complete - patient participated  Level of consciousness: awake and awake and alert  Pain score: 0  Airway patency: patent  Nausea & Vomiting: no nausea and no vomiting  Cardiovascular status: hemodynamically stable  Respiratory status: acceptable  Hydration status: euvolemic  Pain management: adequate and satisfactory to patient    No notable events documented.

## 2024-07-23 ENCOUNTER — OFFICE VISIT (OUTPATIENT)
Dept: CARDIOLOGY CLINIC | Age: 54
End: 2024-07-23
Payer: COMMERCIAL

## 2024-07-23 VITALS
HEART RATE: 87 BPM | HEIGHT: 69 IN | SYSTOLIC BLOOD PRESSURE: 126 MMHG | BODY MASS INDEX: 41.32 KG/M2 | WEIGHT: 279 LBS | DIASTOLIC BLOOD PRESSURE: 82 MMHG

## 2024-07-23 DIAGNOSIS — I48.91 ATRIAL FIBRILLATION, UNSPECIFIED TYPE (HCC): Primary | ICD-10-CM

## 2024-07-23 PROCEDURE — 93000 ELECTROCARDIOGRAM COMPLETE: CPT | Performed by: INTERNAL MEDICINE

## 2024-07-23 PROCEDURE — 3074F SYST BP LT 130 MM HG: CPT | Performed by: INTERNAL MEDICINE

## 2024-07-23 PROCEDURE — 99214 OFFICE O/P EST MOD 30 MIN: CPT | Performed by: INTERNAL MEDICINE

## 2024-07-23 PROCEDURE — 3079F DIAST BP 80-89 MM HG: CPT | Performed by: INTERNAL MEDICINE

## 2024-07-23 RX ORDER — FLECAINIDE ACETATE 100 MG/1
100 TABLET ORAL 2 TIMES DAILY
Qty: 60 TABLET | Refills: 1 | Status: SHIPPED | OUTPATIENT
Start: 2024-07-23

## 2024-07-23 NOTE — PROGRESS NOTES
07/15/24 (!) 131/102   06/20/24 136/82     Pulse Readings from Last 3 Encounters:   07/23/24 87   07/15/24 91   06/20/24 80       CONSTITUTIONAL: Cooperative, no apparent distress, and appears well nourished / developed  NEUROLOGIC:  Awake and orientated to person, place and time.  PSYCH: Calm affect.  SKIN: Warm and dry.  HEENT: Sclera non-icteric, normocephalic, neck supple, no elevation of JVP, normal carotid pulses with no bruits and thyroid normal size.  LUNGS:  No increased work of breathing and clear to auscultation, no crackles or wheezing  CARDIOVASCULAR:  Regular rate and rhythm with no murmurs, gallops, rubs, or abnormal heart sounds, normal PMI.The apical impulses not displaced  Heart tones are crisp and normal  Cervical veins are not engorged  The carotid upstroke is normal in amplitude and contour without delay or bruit  JVP is not elevated  ABDOMEN:  Normal bowel sounds, non-distended and non-tender to palpation  EXT: No edema, no calf tenderness. Pulses are present bilaterally.    DATA:    Lab Results   Component Value Date    ALT 83 (H) 07/15/2024    AST 42 (H) 07/15/2024    ALKPHOS 65 07/15/2024    BILITOT 0.6 07/15/2024     Lab Results   Component Value Date    CREATININE 1.1 07/15/2024    BUN 17 07/15/2024     07/15/2024    K 4.4 07/15/2024     07/15/2024    CO2 22 07/15/2024     Lab Results   Component Value Date    TSH 0.79 11/18/2010     Lab Results   Component Value Date    WBC 8.0 06/04/2024    HGB 14.8 06/04/2024    HCT 43.5 06/04/2024    MCV 95.6 06/04/2024     06/04/2024     No components found for: \"CHLPL\"  Lab Results   Component Value Date    TRIG 76 06/04/2024    TRIG 158 (H) 01/15/2018    TRIG 100 07/19/2016     Lab Results   Component Value Date    HDL 53 06/04/2024    HDL 51 01/15/2018    HDL 65 (H) 07/19/2016     No components found for: \"LDLCALC\"    No components found for: \"LABVLDL\"    Radiology Review:  Pertinent images / reports were reviewed as a part of

## 2024-08-07 NOTE — PROGRESS NOTES
[facility]   Cardiac Electrophysiology Consultation   Date: 8/20/2024  Reason for Consultation:   Consult Requesting Physician: No att. providers found     Chief Complaint:   Chief Complaint   Patient presents with    New Patient     New pt per Dr. SOFIA possible ablation       HPI: Theo Tariq is a 54 y.o. male referred by Dr. Hernandez for consideration of ablation. PMH significant for seizures, HTN, HLD, YANI, obesity, cardiac CTA (5/2023) showed normal coronaries, reported irregular heartbeats for years, new onset AF (5/2024), placed on metoprolol and Eliquis, echo (6/2024) showed preserved LVEF with LAE, 7 day CAM (6/2024) showed 100% AF RVR, s/p DCCV with ERAF within several min (7/15/24), placed on flecainide by Dr. Hernandez.     Interval History:   Today, he is here with his significant other for rhythm management, presenting in AF/AFL CVR. He complains of exhaustion, palpitations, and RIVERA, but states that he has been feeling a little better over the past couple weeks. In the past year, he has gained a lot of weight d/t overeating. He is compliant with CPAP, even using it for naps. He has family h/o of CAD, but no known AF.    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    Assessment:  Newly diagnosed persistent AF, on Eliquis, flecainide, Lopressor  LAE, AF likely going on for some time  HTN, stable on Lopressor  HLD, on statin  YANI, compliant with CPAP  Obesity, morbid    Plan:  Due to symptomatic, persistent AF despite flecainide and LA remodeling, I do recommend rhythm management with AF ablation  No changes with current medications  Optimize risk factors for AF including continued treatment of YANI, BP control, weight loss, healthy diet, routine exercise, and stress management.  Follow up in 1 week with EP NP after the ablation  Follow up in 3 months with me    Discussed in detail about the risk factors, pathophysiology, and treatment options including life stye modifications

## 2024-08-20 ENCOUNTER — OFFICE VISIT (OUTPATIENT)
Dept: CARDIOLOGY CLINIC | Age: 54
End: 2024-08-20

## 2024-08-20 ENCOUNTER — TELEPHONE (OUTPATIENT)
Dept: CARDIOLOGY CLINIC | Age: 54
End: 2024-08-20

## 2024-08-20 VITALS
BODY MASS INDEX: 41.35 KG/M2 | HEART RATE: 87 BPM | WEIGHT: 280 LBS | SYSTOLIC BLOOD PRESSURE: 140 MMHG | DIASTOLIC BLOOD PRESSURE: 80 MMHG

## 2024-08-20 DIAGNOSIS — I48.19 PERSISTENT ATRIAL FIBRILLATION (HCC): Primary | ICD-10-CM

## 2024-08-20 DIAGNOSIS — I10 ESSENTIAL HYPERTENSION: ICD-10-CM

## 2024-08-20 DIAGNOSIS — G47.33 OSA (OBSTRUCTIVE SLEEP APNEA): ICD-10-CM

## 2024-08-20 DIAGNOSIS — E78.49 OTHER HYPERLIPIDEMIA: ICD-10-CM

## 2024-08-20 DIAGNOSIS — E66.9 OBESITY (BMI 30.0-34.9): ICD-10-CM

## 2024-08-20 NOTE — PATIENT INSTRUCTIONS
Electrophysiology Study and Atrial Fibrillation (AFib) Ablation    Date of Procedure:     Time of Arrival:     Cardiologist performing procedure: Dr. Spicer    You will get a call from our  with the date and time.    Do not eat or drink anything after midnight the night before the procedure.      You may brush your teeth and rinse the morning of the procedure.    Please hold flecainide for 2 days prior to procedure.    Do NOT stop taking Eliquis (any blood thinners) leading up to the procedure. However, do not take any blood thinners the morning of the procedure.    Take all your other routine medications the morning of the procedure.    Do not apply any lotion, powder, or deodorant the morning of the procedure.    Please bring a list of your medications to the hospital with you.    CT scan of the chest is due within a week of the procedure. Someone will call you with the date/time of the CT scan.       Lab work is due within a week of the procedure (can be done the same day as the CT scan). You do not need to be fasting for these labs. This can be done at the [facility] lab at [address]    You must have someone available to drive you home the same day.  It is recommended that you do not drive for 48 hours after the procedure.  You will also need someone with you at home the night of the procedure.    If you are unable to make this appointment, please call [facility] [place], at [phone].

## 2024-08-20 NOTE — TELEPHONE ENCOUNTER
Please schedule pt for AF ablation with [facility] at [facility] either the week of 9/16 or 9/30. Instructions reviewed with pt during OV today.  Please call Leena [phone] to schedule everything. Schedule CTA at  (wait until you talk to Leena - pt's schedule is crazy).

## 2024-08-21 ENCOUNTER — PREP FOR PROCEDURE (OUTPATIENT)
Dept: CARDIOLOGY CLINIC | Age: 54
End: 2024-08-21

## 2024-08-21 PROBLEM — I48.19 PERSISTENT ATRIAL FIBRILLATION (HCC): Status: ACTIVE | Noted: 2024-08-20

## 2024-08-21 RX ORDER — SODIUM CHLORIDE 0.9 % (FLUSH) 0.9 %
5-40 SYRINGE (ML) INJECTION EVERY 12 HOURS SCHEDULED
Status: CANCELLED | OUTPATIENT
Start: 2024-08-21

## 2024-08-21 RX ORDER — SODIUM CHLORIDE 0.9 % (FLUSH) 0.9 %
5-40 SYRINGE (ML) INJECTION PRN
Status: CANCELLED | OUTPATIENT
Start: 2024-08-21

## 2024-08-21 RX ORDER — SODIUM CHLORIDE 9 MG/ML
INJECTION, SOLUTION INTRAVENOUS PRN
Status: CANCELLED | OUTPATIENT
Start: 2024-08-21

## 2024-08-21 NOTE — TELEPHONE ENCOUNTER
Spoke with Leena and got pt scheduled for procedures. We went over instructions below and she verbalized understanding.     CTA @ [facility]  Date: 9/20/24  Time: 9:00 am, arrive by 8:30 am     NO Food/Drinks 4 hours prior, NO Caffeine 12 hours prior - check in at     Electrophysiology Study and Atrial Fibrillation (AFib) Ablation     Date of Procedure: 9/30/24     Time of Arrival: 6:30 am  Procedure time: 8:00 am        Cardiologist performing procedure: Dr. Spicer     You will get a call from our  with the date and time.     Do not eat or drink anything after midnight the night before the procedure.       You may brush your teeth and rinse the morning of the procedure.     Please hold flecainide for 2 days prior to procedure.     Do NOT stop taking Eliquis (any blood thinners) leading up to the procedure. However, do not take any blood thinners the morning of the procedure.     Take all your other routine medications the morning of the procedure.     Do not apply any lotion, powder, or deodorant the morning of the procedure.     Please bring a list of your medications to the hospital with you.     CT scan of the chest is due within a week of the procedure. Someone will call you with the date/time of the CT scan.        Lab work is due within a week of the procedure (can be done the same day as the CT scan). You do not need to be fasting for these labs. This can be done at the [facility] at [address]     You must have someone available to drive you home the same day.  It is recommended that you do not drive for 48 hours after the procedure.  You will also need someone with you at home the night of the procedure.     If you are unable to make this appointment, please call [facility], at [phone].    Qgenda updated / updated cupid / emailed cath lab

## 2024-09-04 ENCOUNTER — TELEPHONE (OUTPATIENT)
Dept: CARDIOLOGY CLINIC | Age: 54
End: 2024-09-04

## 2024-09-09 SDOH — ECONOMIC STABILITY: FOOD INSECURITY: WITHIN THE PAST 12 MONTHS, YOU WORRIED THAT YOUR FOOD WOULD RUN OUT BEFORE YOU GOT MONEY TO BUY MORE.: PATIENT DECLINED

## 2024-09-09 SDOH — ECONOMIC STABILITY: FOOD INSECURITY: WITHIN THE PAST 12 MONTHS, THE FOOD YOU BOUGHT JUST DIDN'T LAST AND YOU DIDN'T HAVE MONEY TO GET MORE.: PATIENT DECLINED

## 2024-09-09 SDOH — ECONOMIC STABILITY: TRANSPORTATION INSECURITY
IN THE PAST 12 MONTHS, HAS LACK OF TRANSPORTATION KEPT YOU FROM MEETINGS, WORK, OR FROM GETTING THINGS NEEDED FOR DAILY LIVING?: PATIENT DECLINED

## 2024-09-09 SDOH — ECONOMIC STABILITY: INCOME INSECURITY: HOW HARD IS IT FOR YOU TO PAY FOR THE VERY BASICS LIKE FOOD, HOUSING, MEDICAL CARE, AND HEATING?: PATIENT DECLINED

## 2024-09-09 ASSESSMENT — PATIENT HEALTH QUESTIONNAIRE - PHQ9
SUM OF ALL RESPONSES TO PHQ9 QUESTIONS 1 & 2: 0
SUM OF ALL RESPONSES TO PHQ QUESTIONS 1-9: 0
1. LITTLE INTEREST OR PLEASURE IN DOING THINGS: NOT AT ALL
2. FEELING DOWN, DEPRESSED OR HOPELESS: NOT AT ALL
2. FEELING DOWN, DEPRESSED OR HOPELESS: NOT AT ALL
SUM OF ALL RESPONSES TO PHQ QUESTIONS 1-9: 0
SUM OF ALL RESPONSES TO PHQ QUESTIONS 1-9: 0
SUM OF ALL RESPONSES TO PHQ9 QUESTIONS 1 & 2: 0
1. LITTLE INTEREST OR PLEASURE IN DOING THINGS: NOT AT ALL
SUM OF ALL RESPONSES TO PHQ QUESTIONS 1-9: 0

## 2024-09-11 ENCOUNTER — OFFICE VISIT (OUTPATIENT)
Dept: FAMILY MEDICINE CLINIC | Age: 54
End: 2024-09-11

## 2024-09-11 VITALS
TEMPERATURE: 97.1 F | HEART RATE: 62 BPM | WEIGHT: 286.6 LBS | RESPIRATION RATE: 16 BRPM | OXYGEN SATURATION: 96 % | SYSTOLIC BLOOD PRESSURE: 130 MMHG | DIASTOLIC BLOOD PRESSURE: 100 MMHG | BODY MASS INDEX: 42.32 KG/M2

## 2024-09-11 DIAGNOSIS — Z12.11 COLON CANCER SCREENING: Primary | ICD-10-CM

## 2024-09-11 DIAGNOSIS — I48.19 PERSISTENT ATRIAL FIBRILLATION (HCC): ICD-10-CM

## 2024-09-11 ASSESSMENT — ENCOUNTER SYMPTOMS
NAUSEA: 0
SORE THROAT: 0
COLOR CHANGE: 0
EYE ITCHING: 0
SINUS PRESSURE: 0
WHEEZING: 0
VOMITING: 0
ABDOMINAL PAIN: 0
CONSTIPATION: 0
DIARRHEA: 0
BLOOD IN STOOL: 0
EYE REDNESS: 0
RHINORRHEA: 0
CHEST TIGHTNESS: 0
BACK PAIN: 0
SHORTNESS OF BREATH: 0
COUGH: 0

## 2024-09-16 ENCOUNTER — TELEPHONE (OUTPATIENT)
Dept: CARDIOLOGY CLINIC | Age: 54
End: 2024-09-16

## 2024-09-19 ENCOUNTER — NURSE ONLY (OUTPATIENT)
Dept: CARDIOLOGY CLINIC | Age: 54
End: 2024-09-19
Payer: COMMERCIAL

## 2024-09-19 ENCOUNTER — TELEPHONE (OUTPATIENT)
Dept: CARDIOLOGY CLINIC | Age: 54
End: 2024-09-19

## 2024-09-19 VITALS — HEART RATE: 54 BPM

## 2024-09-19 DIAGNOSIS — I48.19 PERSISTENT ATRIAL FIBRILLATION (HCC): Primary | ICD-10-CM

## 2024-09-19 PROCEDURE — 93000 ELECTROCARDIOGRAM COMPLETE: CPT | Performed by: INTERNAL MEDICINE

## 2024-09-19 RX ORDER — FLECAINIDE ACETATE 100 MG/1
100 TABLET ORAL 2 TIMES DAILY
Qty: 180 TABLET | Refills: 3 | Status: SHIPPED | OUTPATIENT
Start: 2024-09-19

## 2024-09-19 RX ORDER — METOPROLOL TARTRATE 25 MG/1
25 TABLET, FILM COATED ORAL 2 TIMES DAILY
Qty: 180 TABLET | Refills: 3 | Status: SHIPPED | OUTPATIENT
Start: 2024-09-19

## 2024-09-20 ENCOUNTER — HOSPITAL ENCOUNTER (OUTPATIENT)
Age: 54
Discharge: HOME | End: 2024-09-20
Payer: COMMERCIAL

## 2024-09-20 DIAGNOSIS — E78.5 HYPERLIPIDEMIA LDL GOAL <70: ICD-10-CM

## 2024-09-20 DIAGNOSIS — I48.19 PERSISTENT ATRIAL FIBRILLATION (HCC): ICD-10-CM

## 2024-09-20 DIAGNOSIS — Z00.00 ROUTINE CHECK-UP: ICD-10-CM

## 2024-09-20 DIAGNOSIS — I10 ESSENTIAL HYPERTENSION: ICD-10-CM

## 2024-09-20 LAB
ALBUMIN SERPL-MCNC: 4.4 G/DL (ref 3.4–5)
ALBUMIN/GLOB SERPL: 2 {RATIO} (ref 1.1–2.2)
ALP SERPL-CCNC: 74 U/L (ref 40–129)
ALT SERPL-CCNC: 51 U/L (ref 10–40)
ANION GAP SERPL CALCULATED.3IONS-SCNC: 11 MMOL/L (ref 3–16)
AST SERPL-CCNC: 33 U/L (ref 15–37)
BILIRUB SERPL-MCNC: 0.5 MG/DL (ref 0–1)
BUN SERPL-MCNC: 28 MG/DL (ref 7–20)
CALCIUM SERPL-MCNC: 9.6 MG/DL (ref 8.3–10.6)
CHLORIDE SERPL-SCNC: 105 MMOL/L (ref 99–110)
CHOLEST SERPL-MCNC: 150 MG/DL (ref 0–199)
CO2 SERPL-SCNC: 23 MMOL/L (ref 21–32)
CREAT SERPL-MCNC: 1 MG/DL (ref 0.9–1.3)
DEPRECATED RDW RBC AUTO: 14 % (ref 12.4–15.4)
GFR SERPLBLD CREATININE-BSD FMLA CKD-EPI: 88 ML/MIN/{1.73_M2}
GLUCOSE SERPL-MCNC: 91 MG/DL (ref 70–99)
HCT VFR BLD AUTO: 46.2 % (ref 40.5–52.5)
HDLC SERPL-MCNC: 39 MG/DL (ref 40–60)
HGB BLD-MCNC: 15.7 G/DL (ref 13.5–17.5)
INR PPP: 1.19 (ref 0.85–1.15)
LDLC SERPL CALC-MCNC: 92 MG/DL
MCH RBC QN AUTO: 31.8 PG (ref 26–34)
MCHC RBC AUTO-ENTMCNC: 34.1 G/DL (ref 31–36)
MCV RBC AUTO: 93.4 FL (ref 80–100)
PLATELET # BLD AUTO: 200 K/UL (ref 135–450)
PMV BLD AUTO: 8.5 FL (ref 5–10.5)
POTASSIUM SERPL-SCNC: 4.7 MMOL/L (ref 3.5–5.1)
PROT SERPL-MCNC: 6.6 G/DL (ref 6.4–8.2)
PROTHROMBIN TIME: 15.3 SEC (ref 11.9–14.9)
RBC # BLD AUTO: 4.94 M/UL (ref 4.2–5.9)
SODIUM SERPL-SCNC: 139 MMOL/L (ref 136–145)
TRIGL SERPL-MCNC: 94 MG/DL (ref 0–150)
VLDLC SERPL CALC-MCNC: 19 MG/DL
WBC # BLD AUTO: 7.4 K/UL (ref 4–11)

## 2024-09-20 PROCEDURE — 71275 CT ANGIOGRAPHY CHEST: CPT

## 2024-09-20 PROCEDURE — 6360000004 HC RX CONTRAST MEDICATION: Performed by: INTERNAL MEDICINE

## 2024-09-20 RX ORDER — IOPAMIDOL 755 MG/ML
100 INJECTION, SOLUTION INTRAVASCULAR
Status: COMPLETED | OUTPATIENT
Start: 2024-09-20 | End: 2024-09-20

## 2024-09-20 RX ADMIN — IOPAMIDOL 100 ML: 755 INJECTION, SOLUTION INTRAVENOUS at 09:07

## 2024-09-23 RX ORDER — FLECAINIDE ACETATE 100 MG/1
100 TABLET ORAL 2 TIMES DAILY
Qty: 60 TABLET | OUTPATIENT
Start: 2024-09-23

## 2024-09-23 RX ORDER — APIXABAN 5 MG/1
5 TABLET, FILM COATED ORAL 2 TIMES DAILY
Qty: 60 TABLET | OUTPATIENT
Start: 2024-09-23

## 2024-09-23 RX ORDER — METOPROLOL TARTRATE 25 MG/1
25 TABLET, FILM COATED ORAL 2 TIMES DAILY
Qty: 60 TABLET | OUTPATIENT
Start: 2024-09-23

## 2024-09-24 ENCOUNTER — TELEPHONE (OUTPATIENT)
Dept: CARDIOLOGY CLINIC | Age: 54
End: 2024-09-24

## 2024-09-24 NOTE — TELEPHONE ENCOUNTER
[facility] called to state the ablation was approved.  Ref# [id number]  Date range: 09.30.2024 - 12.29.2024

## 2024-09-25 ENCOUNTER — TELEPHONE (OUTPATIENT)
Dept: CARDIOLOGY CLINIC | Age: 54
End: 2024-09-25

## 2024-09-25 NOTE — TELEPHONE ENCOUNTER
LVM asking pt to call back.  Need to confirm date/time and preop instructions for upcoming Afib ablation.      Spoke with Leena and got pt scheduled for procedures. We went over instructions below and she verbalized understanding.      CTA @ [facility]  Date: 9/20/24  Time: 9:00 am, arrive by 8:30 am      NO Food/Drinks 4 hours prior, NO Caffeine 12 hours prior - check in at      Electrophysiology Study and Atrial Fibrillation (AFib) Ablation     Date of Procedure: 9/30/24     Time of Arrival: 6:30 am  Procedure time: 8:00 am         Cardiologist performing procedure: Dr. Spicer     You will get a call from our  with the date and time.     Do not eat or drink anything after midnight the night before the procedure.       You may brush your teeth and rinse the morning of the procedure.     Please hold flecainide for 2 days prior to procedure.     Do NOT stop taking Eliquis (any blood thinners) leading up to the procedure. However, do not take any blood thinners the morning of the procedure.     Take all your other routine medications the morning of the procedure.     Do not apply any lotion, powder, or deodorant the morning of the procedure.     Please bring a list of your medications to the hospital with you.     CT scan of the chest is due within a week of the procedure. Someone will call you with the date/time of the CT scan.        Lab work is due within a week of the procedure (can be done the same day as the CT scan). You do not need to be fasting for these labs. This can be done at the [facility] lab at [address]     You must have someone available to drive you home the same day.  It is recommended that you do not drive for 48 hours after the procedure.  You will also need someone with you at home the night of the procedure.     If you are unable to make this appointment, please call [facility], at [phone].     Qgenda updated / updated cupid / emailed cath lab

## 2024-09-27 NOTE — PRE-PROCEDURE INSTRUCTIONS
Called patient about procedure. Told to be here at 0630 for procedure at 0800. Must be NPO after midnight but can take morning medication with sips of water. Patient told to not take Eliquis the morning of the procedure and to hold Flecainide 2 days prior as directed by the office. States he has not missed any doses. Told to have a responsible adult with them to take them home and stay with them afterwards, if they do not get admitted to hospital. Also, to bring a current list of medications. No other questions or concerns.

## 2024-09-30 ENCOUNTER — HOSPITAL ENCOUNTER (OUTPATIENT)
Age: 54
Setting detail: OUTPATIENT SURGERY
Discharge: HOME | End: 2024-09-30
Attending: INTERNAL MEDICINE | Admitting: INTERNAL MEDICINE
Payer: COMMERCIAL

## 2024-09-30 ENCOUNTER — ANESTHESIA (OUTPATIENT)
Dept: INTERVENTIONAL RADIOLOGY/VASCULAR | Age: 54
End: 2024-09-30
Payer: COMMERCIAL

## 2024-09-30 ENCOUNTER — ANESTHESIA EVENT (OUTPATIENT)
Dept: INTERVENTIONAL RADIOLOGY/VASCULAR | Age: 54
End: 2024-09-30
Payer: COMMERCIAL

## 2024-09-30 VITALS
RESPIRATION RATE: 16 BRPM | BODY MASS INDEX: 39.97 KG/M2 | WEIGHT: 279.2 LBS | SYSTOLIC BLOOD PRESSURE: 158 MMHG | DIASTOLIC BLOOD PRESSURE: 91 MMHG | HEART RATE: 59 BPM | OXYGEN SATURATION: 94 % | TEMPERATURE: 98 F | HEIGHT: 70 IN

## 2024-09-30 DIAGNOSIS — I48.19 PERSISTENT ATRIAL FIBRILLATION (HCC): ICD-10-CM

## 2024-09-30 LAB
BASE EXCESS BLDV CALC-SCNC: -1 MMOL/L (ref -3–3)
CA-I BLD-SCNC: 1.22 MMOL/L (ref 1.12–1.32)
CO2 BLDV-SCNC: 25 MMOL/L
ECHO BSA: 2.5 M2
GLUCOSE BLD-MCNC: 129 MG/DL (ref 70–99)
HCO3 BLDV-SCNC: 23.9 MMOL/L (ref 23–29)
INR BLD: 1.4 (ref 0.84–1.16)
PCO2 BLDV: 36.6 MM HG (ref 40–50)
PERFORMED ON: ABNORMAL
PH BLDV: 7.42 [PH] (ref 7.35–7.45)
PO2 BLDV: 62 MM HG
POC CREATININE: 0.8 MG/DL (ref 0.9–1.3)
POC SAMPLE TYPE: ABNORMAL
POTASSIUM BLD-SCNC: 3.9 MMOL/L (ref 3.5–5.1)
SAO2 % BLDV: 92 %
SODIUM BLD-SCNC: 144 MMOL/L (ref 136–145)

## 2024-09-30 PROCEDURE — 2500000003 HC RX 250 WO HCPCS

## 2024-09-30 PROCEDURE — 82565 ASSAY OF CREATININE: CPT

## 2024-09-30 PROCEDURE — 82803 BLOOD GASES ANY COMBINATION: CPT

## 2024-09-30 PROCEDURE — 85610 PROTHROMBIN TIME: CPT

## 2024-09-30 PROCEDURE — 82947 ASSAY GLUCOSE BLOOD QUANT: CPT

## 2024-09-30 PROCEDURE — 84132 ASSAY OF SERUM POTASSIUM: CPT

## 2024-09-30 PROCEDURE — 82330 ASSAY OF CALCIUM: CPT

## 2024-09-30 PROCEDURE — 6360000002 HC RX W HCPCS

## 2024-09-30 PROCEDURE — 3700000001 HC ADD 15 MINUTES (ANESTHESIA): Performed by: INTERNAL MEDICINE

## 2024-09-30 PROCEDURE — 3700000000 HC ANESTHESIA ATTENDED CARE: Performed by: INTERNAL MEDICINE

## 2024-09-30 PROCEDURE — 2580000003 HC RX 258

## 2024-09-30 PROCEDURE — 84295 ASSAY OF SERUM SODIUM: CPT

## 2024-09-30 RX ORDER — MIDAZOLAM HYDROCHLORIDE 1 MG/ML
INJECTION INTRAMUSCULAR; INTRAVENOUS
Status: DISCONTINUED | OUTPATIENT
Start: 2024-09-30 | End: 2024-09-30 | Stop reason: SDUPTHER

## 2024-09-30 RX ORDER — SODIUM CHLORIDE, SODIUM LACTATE, POTASSIUM CHLORIDE, CALCIUM CHLORIDE 600; 310; 30; 20 MG/100ML; MG/100ML; MG/100ML; MG/100ML
INJECTION, SOLUTION INTRAVENOUS
Status: DISCONTINUED | OUTPATIENT
Start: 2024-09-30 | End: 2024-09-30 | Stop reason: SDUPTHER

## 2024-09-30 RX ORDER — SODIUM CHLORIDE 9 MG/ML
INJECTION, SOLUTION INTRAVENOUS PRN
Status: DISCONTINUED | OUTPATIENT
Start: 2024-09-30 | End: 2024-09-30 | Stop reason: HOSPADM

## 2024-09-30 RX ORDER — FENTANYL CITRATE 50 UG/ML
50 INJECTION, SOLUTION INTRAMUSCULAR; INTRAVENOUS EVERY 5 MIN PRN
Status: CANCELLED | OUTPATIENT
Start: 2024-09-30

## 2024-09-30 RX ORDER — ONDANSETRON 2 MG/ML
4 INJECTION INTRAMUSCULAR; INTRAVENOUS
Status: CANCELLED | OUTPATIENT
Start: 2024-09-30 | End: 2024-10-01

## 2024-09-30 RX ORDER — DEXAMETHASONE SODIUM PHOSPHATE 4 MG/ML
INJECTION, SOLUTION INTRA-ARTICULAR; INTRALESIONAL; INTRAMUSCULAR; INTRAVENOUS; SOFT TISSUE
Status: DISCONTINUED | OUTPATIENT
Start: 2024-09-30 | End: 2024-09-30 | Stop reason: SDUPTHER

## 2024-09-30 RX ORDER — SODIUM CHLORIDE 0.9 % (FLUSH) 0.9 %
5-40 SYRINGE (ML) INJECTION PRN
Status: CANCELLED | OUTPATIENT
Start: 2024-09-30

## 2024-09-30 RX ORDER — LIDOCAINE HYDROCHLORIDE 20 MG/ML
INJECTION, SOLUTION EPIDURAL; INFILTRATION; INTRACAUDAL; PERINEURAL
Status: DISCONTINUED | OUTPATIENT
Start: 2024-09-30 | End: 2024-09-30 | Stop reason: SDUPTHER

## 2024-09-30 RX ORDER — ROCURONIUM BROMIDE 10 MG/ML
INJECTION, SOLUTION INTRAVENOUS
Status: DISCONTINUED | OUTPATIENT
Start: 2024-09-30 | End: 2024-09-30 | Stop reason: SDUPTHER

## 2024-09-30 RX ORDER — METOCLOPRAMIDE HYDROCHLORIDE 5 MG/ML
10 INJECTION INTRAMUSCULAR; INTRAVENOUS
Status: CANCELLED | OUTPATIENT
Start: 2024-09-30 | End: 2024-10-01

## 2024-09-30 RX ORDER — SODIUM CHLORIDE 0.9 % (FLUSH) 0.9 %
5-40 SYRINGE (ML) INJECTION EVERY 12 HOURS SCHEDULED
Status: CANCELLED | OUTPATIENT
Start: 2024-09-30

## 2024-09-30 RX ORDER — PROPOFOL 10 MG/ML
INJECTION, EMULSION INTRAVENOUS
Status: DISCONTINUED | OUTPATIENT
Start: 2024-09-30 | End: 2024-09-30 | Stop reason: SDUPTHER

## 2024-09-30 RX ORDER — SODIUM CHLORIDE 0.9 % (FLUSH) 0.9 %
5-40 SYRINGE (ML) INJECTION PRN
Status: DISCONTINUED | OUTPATIENT
Start: 2024-09-30 | End: 2024-09-30 | Stop reason: HOSPADM

## 2024-09-30 RX ORDER — NALOXONE HYDROCHLORIDE 0.4 MG/ML
INJECTION, SOLUTION INTRAMUSCULAR; INTRAVENOUS; SUBCUTANEOUS PRN
Status: CANCELLED | OUTPATIENT
Start: 2024-09-30

## 2024-09-30 RX ORDER — SUCCINYLCHOLINE/SOD CL,ISO/PF 100 MG/5ML
SYRINGE (ML) INTRAVENOUS
Status: DISCONTINUED | OUTPATIENT
Start: 2024-09-30 | End: 2024-09-30 | Stop reason: SDUPTHER

## 2024-09-30 RX ORDER — HYDROMORPHONE HYDROCHLORIDE 1 MG/ML
0.25 INJECTION, SOLUTION INTRAMUSCULAR; INTRAVENOUS; SUBCUTANEOUS EVERY 5 MIN PRN
Status: CANCELLED | OUTPATIENT
Start: 2024-09-30

## 2024-09-30 RX ORDER — FENTANYL CITRATE 50 UG/ML
INJECTION, SOLUTION INTRAMUSCULAR; INTRAVENOUS
Status: DISCONTINUED | OUTPATIENT
Start: 2024-09-30 | End: 2024-09-30 | Stop reason: SDUPTHER

## 2024-09-30 RX ORDER — SODIUM CHLORIDE 9 MG/ML
INJECTION, SOLUTION INTRAVENOUS PRN
Status: CANCELLED | OUTPATIENT
Start: 2024-09-30

## 2024-09-30 RX ORDER — SODIUM CHLORIDE 0.9 % (FLUSH) 0.9 %
5-40 SYRINGE (ML) INJECTION EVERY 12 HOURS SCHEDULED
Status: DISCONTINUED | OUTPATIENT
Start: 2024-09-30 | End: 2024-09-30 | Stop reason: HOSPADM

## 2024-09-30 RX ORDER — LABETALOL HYDROCHLORIDE 5 MG/ML
10 INJECTION, SOLUTION INTRAVENOUS
Status: CANCELLED | OUTPATIENT
Start: 2024-09-30

## 2024-09-30 RX ORDER — MIDAZOLAM HYDROCHLORIDE 1 MG/ML
2 INJECTION INTRAMUSCULAR; INTRAVENOUS
Status: CANCELLED | OUTPATIENT
Start: 2024-09-30 | End: 2024-10-01

## 2024-09-30 RX ORDER — ONDANSETRON 2 MG/ML
INJECTION INTRAMUSCULAR; INTRAVENOUS
Status: DISCONTINUED | OUTPATIENT
Start: 2024-09-30 | End: 2024-09-30 | Stop reason: SDUPTHER

## 2024-09-30 RX ADMIN — FENTANYL CITRATE 50 MCG: 50 INJECTION, SOLUTION INTRAMUSCULAR; INTRAVENOUS at 09:03

## 2024-09-30 RX ADMIN — DEXAMETHASONE SODIUM PHOSPHATE 4 MG: 4 INJECTION INTRA-ARTICULAR; INTRALESIONAL; INTRAMUSCULAR; INTRAVENOUS; SOFT TISSUE at 08:12

## 2024-09-30 RX ADMIN — Medication 160 MG: at 08:04

## 2024-09-30 RX ADMIN — LIDOCAINE HYDROCHLORIDE 100 MG: 20 INJECTION, SOLUTION EPIDURAL; INFILTRATION; INTRACAUDAL; PERINEURAL at 09:03

## 2024-09-30 RX ADMIN — PROPOFOL 200 MG: 10 INJECTION, EMULSION INTRAVENOUS at 08:03

## 2024-09-30 RX ADMIN — FENTANYL CITRATE 50 MCG: 50 INJECTION, SOLUTION INTRAMUSCULAR; INTRAVENOUS at 08:03

## 2024-09-30 RX ADMIN — ROCURONIUM BROMIDE 60 MG: 10 INJECTION, SOLUTION INTRAVENOUS at 08:11

## 2024-09-30 RX ADMIN — ONDANSETRON 4 MG: 2 INJECTION INTRAMUSCULAR; INTRAVENOUS at 08:12

## 2024-09-30 RX ADMIN — SUGAMMADEX 200 MG: 100 INJECTION, SOLUTION INTRAVENOUS at 09:03

## 2024-09-30 RX ADMIN — SODIUM CHLORIDE, SODIUM LACTATE, POTASSIUM CHLORIDE, AND CALCIUM CHLORIDE: .6; .31; .03; .02 INJECTION, SOLUTION INTRAVENOUS at 07:51

## 2024-09-30 RX ADMIN — MIDAZOLAM HYDROCHLORIDE 2 MG: 2 INJECTION, SOLUTION INTRAMUSCULAR; INTRAVENOUS at 07:51

## 2024-09-30 ASSESSMENT — PAIN - FUNCTIONAL ASSESSMENT: PAIN_FUNCTIONAL_ASSESSMENT: NONE - DENIES PAIN

## 2024-09-30 ASSESSMENT — LIFESTYLE VARIABLES: SMOKING_STATUS: 0

## 2024-09-30 NOTE — ANESTHESIA PRE PROCEDURE
Persistent atrial fibrillation (HCC) I48.19       Past Medical History:        Diagnosis Date    Family history of ischemic heart disease     Seizures (HCC)     5 years ago, no recurrence       Past Surgical History:  History reviewed. No pertinent surgical history.    Social History:    Social History     Tobacco Use    Smoking status: Former     Current packs/day: 0.00     Types: Cigarettes     Quit date: 2011     Years since quittin.8    Smokeless tobacco: Never   Substance Use Topics    Alcohol use: No     Alcohol/week: 4.0 standard drinks of alcohol     Types: 4 Standard drinks or equivalent per week                                Counseling given: Not Answered      Vital Signs (Current):   Vitals:    24 0709   TempSrc: Oral   Weight: 126.6 kg (279 lb 3.2 oz)   Height: 1.778 m (5' 10\")                                              BP Readings from Last 3 Encounters:   24 (!) 130/100   24 (!) 140/80   24 126/82       NPO Status:                                                                                 BMI:   Wt Readings from Last 3 Encounters:   24 126.6 kg (279 lb 3.2 oz)   24 130 kg (286 lb 9.6 oz)   24 127 kg (280 lb)     Body mass index is 40.06 kg/m².    CBC:   Lab Results   Component Value Date/Time    WBC 7.4 2024 11:48 AM    RBC 4.94 2024 11:48 AM    HGB 15.7 2024 11:48 AM    HCT 46.2 2024 11:48 AM    MCV 93.4 2024 11:48 AM    RDW 14.0 2024 11:48 AM     2024 11:48 AM       CMP:   Lab Results   Component Value Date/Time     2024 11:48 AM    K 4.7 2024 11:48 AM     2024 11:48 AM    CO2 23 2024 11:48 AM    BUN 28 2024 11:48 AM    CREATININE 1.0 2024 11:48 AM    GFRAA >60 01/15/2018 02:59 PM    GFRAA >60 2012 12:01 PM    AGRATIO 2.0 2024 11:48 AM    LABGLOM 88 2024 11:48 AM    GLUCOSE 91 2024 11:48 AM    CALCIUM 9.6 2024 11:48

## 2024-09-30 NOTE — PROGRESS NOTES
Cath Lab Pre Procedure Flowsheet    Plan of Care:     Hemodynamics and cardiac rhythm will remain stable.   Comfort level will be maintained.   Respiratory function will remain adequate.   Pt/family will verbalize understanding of the procedure.   Procedure will be tolerated without complications.   Patient will recover from procedure without complications.   ID armband on patient and identification verified.   Informed consent obtained.   Non invasive blood pressure cuff applied, monitoring initiated.   EKG pads and pulse oximeter applied, monitoring initiated.   Instructions given. Patient and / or family verbalize understanding.   H&P will be documented by physician in [facility].     Pre-procedure:    NPO Status: Pt has been NPO since midnight. .    Contrast / IV Dye Allergy:  None    Pregnancy Test: N/A.    Prep Sites: Groin(s)  Chest    Nicolas's Test: N/A    Pulses:  Bilat Femoral 2, Bilat DP 2, Bilat PT 2    Anticoagulants: Eliquis. Last Dose: 9/29/2024.  Any missed doses:  Yes..     Antiplatelets: None.     Chief Complaint:  Arrythmia    Diabetic: No    Pre EKG Rhythm: Sinus Rhythm    Pre SBP:  144    IV access:  PIV #20 LH     Pre-procedure blood work collected by:  SARAH Rosenthal RN at 0724    [facility]:  N/A

## 2024-09-30 NOTE — H&P (VIEW-ONLY)
[facility]   Cardiac Electrophysiology H&P  Date: 9/30/2024  Admit Date:  9/30/2024  Reason for admission: Atrial fibrillation  Consult Requesting Physician: Hank Crawford*     No chief complaint on file.    HPI: Theo Tariq is a 54 y.o. male referred by Dr. Hernandez for consideration of ablation. PMH significant for seizures, HTN, HLD, YANI, obesity, cardiac CTA (5/2023) showed normal coronaries, reported irregular heartbeats for years, new onset AF (5/2024), placed on metoprolol and Eliquis, echo (6/2024) showed preserved LVEF with LAE, 7 day CAM (6/2024) showed 100% AF RVR, s/p DCCV with ERAF within several min (7/15/24), placed on flecainide by Dr. Hernandez.     Today, he is here for atrial fibrillation ablation.      Past Medical History:   Diagnosis Date    Family history of ischemic heart disease     Seizures (HCC)     5 years ago, no recurrence        History reviewed. No pertinent surgical history.    No Known Allergies    Social History:  Reviewed.  reports that he quit smoking about 12 years ago. His smoking use included cigarettes. He has never used smokeless tobacco. He reports that he does not drink alcohol and does not use drugs.     Family History:  Reviewed. family history includes Cancer in his mother; Diabetes in his mother; Heart Disease in his father; Stroke in his mother.   No premature CAD.     Review of System:  All other systems reviewed except for that noted above. Pertinent negatives and positives are:     Objective      General: negative for fever, chills   Ophthalmic ROS: negative for - eye pain or loss of vision  ENT ROS: negative for - headaches, sore throat   Respiratory: negative for - cough, sputum  Cardiovascular: Reviewed in HPI  Gastrointestinal: negative for - abdominal pain, diarrhea, N/V  Hematology: negative for - bleeding, blood clots, bruising or jaundice  Genito-Urinary:  negative for - Dysuria or incontinence  Musculoskeletal: negative for -

## 2024-09-30 NOTE — ANESTHESIA POSTPROCEDURE EVALUATION
Department of Anesthesiology  Postprocedure Note    Patient: Theo Tariq  MRN: [id number]  YOB: 1970  Date of evaluation: 9/30/2024    Procedure Summary       Date: 09/30/24 Room / Location: [facility] SPECIAL PROCEDURES 3 / [facility] CARDIAC CATH LAB    Anesthesia Start: 0751 Anesthesia Stop: 0921    Procedure: Ablation A-fib w complete ep study Diagnosis:       Persistent atrial fibrillation (HCC)      (Persistent atrial fibrillation (HCC) [I48.19])    Providers: Alley Crawford MD Responsible Provider: Edil Posey MD    Anesthesia Type: general ASA Status: 3            Anesthesia Type: No value filed.    Malorie Phase I: Malorie Score: 10    Malorie Phase II:      Anesthesia Post Evaluation    Patient location during evaluation: PACU  Level of consciousness: awake and alert  Pain score: 0  Airway patency: patent  Nausea & Vomiting: no vomiting  Cardiovascular status: blood pressure returned to baseline  Respiratory status: acceptable  Hydration status: euvolemic  Multimodal analgesia pain management approach  Pain management: adequate    There were no known notable events for this encounter.

## 2024-09-30 NOTE — PROGRESS NOTES
Admitted to pacu at this time. Pt arousable and OA removed. Reoriented to surroundings. Pts procedure was cancelled but received General anesthesia. Will recover and then return back to preop. VSS on monitor.In NSR

## 2024-09-30 NOTE — DISCHARGE INSTRUCTIONS
[facility]  Cardiovascular Special Procedures  General Discharge Instructions    PROCEDURE:     _x__ You may be drowsy or lightheaded after receiving sedation. DO NOT operate a vehicle (automobile, bicycle, motorcycle, machinery, or power tools), make any important decisions or sign any important/legal documents, or drink alcoholic beverages for the next 24 hours  _x__ We strongly suggest that a responsible adult be with you for the next 24 hours for your protection and safety  _x__ If the intravenous catheter site is painful, apply warm wet compresses on the site until the soreness is relieved and elevate the arm above the heart.  Call your physician if no improvement in 2 to 3 days    DIETARY INSTRUCTIONS:    ____ Drink extra fluids over the next 24 hours (If not contraindicated by illness or by physician order)  ____ Start with clear liquids and progress to normal diet as you feel like eating.  If you experience nausea or repeated episodes of vomiting, which persist beyond 12-24 hours, notify your doctor        _x___ Resume your previous diet    ACTIVITY INSTRUCTIONS:    ____ See other instructions  ____ No special instructions  ____ Rest for 24 hours    ____ Up as tolerated  _x___ Increase activity as tolerated    Wound/Dressing Instructions:  ____ See other instructions  ____ May shower, tomorrow  ____ Remove bandage within 24 hours    MEDICATION INSTRUCTIONS:    _x__ See Medication Reconciliation Sheet      SPECIAL INSTRUCTIONS:    FOLLOW-UP APPOINTMENT    Follow up with MD as directed.    Belongings returned to patient and/or family: Yes.    The Discharge Instructions have been explained to me. I understand and can verbalize these instructions.

## 2024-09-30 NOTE — PROGRESS NOTES
PACU Transfer to [facility]    Vitals:    09/30/24 1015   BP: (!) 158/91   Pulse: 59   Resp: 16   Temp: 98 °F (36.7 °C)   SpO2: 94%         Intake/Output Summary (Last 24 hours) at 9/30/2024 1032  Last data filed at 9/30/2024 0920  Gross per 24 hour   Intake 500 ml   Output 0 ml   Net 500 ml       Pain assessment:  none       Patient transferred to care of [facility] RN.    9/30/2024 10:32 AM

## 2024-09-30 NOTE — H&P
[facility]   Cardiac Electrophysiology H&P  Date: 9/30/2024  Admit Date:  9/30/2024  Reason for admission: Atrial fibrillation  Consult Requesting Physician: Hank Crawford*     No chief complaint on file.    HPI: Theo Tariq is a 54 y.o. male referred by Dr. Hernandez for consideration of ablation. PMH significant for seizures, HTN, HLD, YANI, obesity, cardiac CTA (5/2023) showed normal coronaries, reported irregular heartbeats for years, new onset AF (5/2024), placed on metoprolol and Eliquis, echo (6/2024) showed preserved LVEF with LAE, 7 day CAM (6/2024) showed 100% AF RVR, s/p DCCV with ERAF within several min (7/15/24), placed on flecainide by Dr. Hernandez.     Today, he is here for atrial fibrillation ablation.      Past Medical History:   Diagnosis Date    Family history of ischemic heart disease     Seizures (HCC)     5 years ago, no recurrence        History reviewed. No pertinent surgical history.    No Known Allergies    Social History:  Reviewed.  reports that he quit smoking about 12 years ago. His smoking use included cigarettes. He has never used smokeless tobacco. He reports that he does not drink alcohol and does not use drugs.     Family History:  Reviewed. family history includes Cancer in his mother; Diabetes in his mother; Heart Disease in his father; Stroke in his mother.   No premature CAD.     Review of System:  All other systems reviewed except for that noted above. Pertinent negatives and positives are:     Objective      General: negative for fever, chills   Ophthalmic ROS: negative for - eye pain or loss of vision  ENT ROS: negative for - headaches, sore throat   Respiratory: negative for - cough, sputum  Cardiovascular: Reviewed in HPI  Gastrointestinal: negative for - abdominal pain, diarrhea, N/V  Hematology: negative for - bleeding, blood clots, bruising or jaundice  Genito-Urinary:  negative for - Dysuria or incontinence  Musculoskeletal: negative for -

## 2024-10-01 ENCOUNTER — TELEPHONE (OUTPATIENT)
Dept: CARDIOLOGY CLINIC | Age: 54
End: 2024-10-01

## 2024-10-01 DIAGNOSIS — I48.19 PERSISTENT ATRIAL FIBRILLATION (HCC): Primary | ICD-10-CM

## 2024-10-01 NOTE — TELEPHONE ENCOUNTER
Spoke with Leena and got the pt rescheduled for procedure. We went over instructions below again and she verbalized understanding.     Electrophysiology Study and Atrial Fibrillation (AFib) Ablation     Date of Procedure: 10/22/24     Time of Arrival: 9:30 am  Procedure time: 11:00 am         Cardiologist performing procedure: Dr. Spicer     You will get a call from our  with the date and time.     Do not eat or drink anything after midnight the night before the procedure.       You may brush your teeth and rinse the morning of the procedure.     Please hold flecainide for 2 days prior to procedure.     Do NOT stop taking Eliquis (any blood thinners) leading up to the procedure. However, do not take any blood thinners the morning of the procedure.     Take all your other routine medications the morning of the procedure.     Do not apply any lotion, powder, or deodorant the morning of the procedure.     Please bring a list of your medications to the hospital with you.     CT scan of the chest is due within a week of the procedure. Someone will call you with the date/time of the CT scan.        Lab work is due within a week of the procedure (can be done the same day as the CT scan). You do not need to be fasting for these labs. This can be done at the [facility] lab at [address]     You must have someone available to drive you home the same day.  It is recommended that you do not drive for 48 hours after the procedure.  You will also need someone with you at home the night of the procedure.     If you are unable to make this appointment, please call [facility], at [phone].    Qgenda updated / updated cupidjerome / emailed cath lab

## 2024-10-04 NOTE — PROGRESS NOTES
Health Maintenance       Cervical Cancer Screening (Pap Smear - Every 3 Years)  Overdue since 1/13/2024    Influenza Vaccine (1)  Due since 9/1/2024           Following review of the above:  Patient is not proceeding with: Influenza    Note: Refer to final orders and clinician documentation.         metoprolol and Eliquis.  Fasting lipid and CMP in 3 months at consider cardioversion in the next 3 to 4 weeks.  ZIO monitor for 2 weeks to make sure he does not have an atrial fibs.  Please call if we can assist further [phone].  Shyam Hernandez MD, [facility]      This note was likely completed using voice recognition technology and may contain unintended errors

## 2024-10-08 ENCOUNTER — TELEPHONE (OUTPATIENT)
Dept: CARDIOLOGY CLINIC | Age: 54
End: 2024-10-08

## 2024-10-08 NOTE — TELEPHONE ENCOUNTER
Spoke with pt and confirmed Afib Ablation on 10/22/2024, pt arriving at [facility] at 09:30am.  Reviewed all preop instructions previously given to pt.  Pt verbalized understanding.      Electrophysiology Study and Atrial Fibrillation (AFib) Ablation     Date of Procedure: 10/22/24     Time of Arrival: 9:30 am  Procedure time: 11:00 am         Cardiologist performing procedure: Dr. Spicer     You will get a call from our  with the date and time.     Do not eat or drink anything after midnight the night before the procedure.       You may brush your teeth and rinse the morning of the procedure.     Please hold flecainide for 2 days prior to procedure.     Do NOT stop taking Eliquis (any blood thinners) leading up to the procedure. However, do not take any blood thinners the morning of the procedure.     Take all your other routine medications the morning of the procedure.     Do not apply any lotion, powder, or deodorant the morning of the procedure.     Please bring a list of your medications to the hospital with you.     CT scan of the chest is due within a week of the procedure. Someone will call you with the date/time of the CT scan.        Lab work is due within a week of the procedure (can be done the same day as the CT scan). You do not need to be fasting for these labs. This can be done at the [facility] lab at [address]     You must have someone available to drive you home the same day.  It is recommended that you do not drive for 48 hours after the procedure.  You will also need someone with you at home the night of the procedure.     If you are unable to make this appointment, please call [facility] [place], at [phone].     Qgenda updated / updated cupid, carto / emailed cath lab

## 2024-10-21 ENCOUNTER — ANESTHESIA EVENT (OUTPATIENT)
Dept: INTERVENTIONAL RADIOLOGY/VASCULAR | Age: 54
End: 2024-10-21
Payer: COMMERCIAL

## 2024-10-21 DIAGNOSIS — I48.19 PERSISTENT ATRIAL FIBRILLATION (HCC): ICD-10-CM

## 2024-10-21 LAB
ANION GAP SERPL CALCULATED.3IONS-SCNC: 9 MMOL/L (ref 3–16)
BUN SERPL-MCNC: 18 MG/DL (ref 7–20)
CALCIUM SERPL-MCNC: 9.2 MG/DL (ref 8.3–10.6)
CHLORIDE SERPL-SCNC: 107 MMOL/L (ref 99–110)
CO2 SERPL-SCNC: 27 MMOL/L (ref 21–32)
CREAT SERPL-MCNC: 0.9 MG/DL (ref 0.9–1.3)
DEPRECATED RDW RBC AUTO: 14.3 % (ref 12.4–15.4)
GFR SERPLBLD CREATININE-BSD FMLA CKD-EPI: >90 ML/MIN/{1.73_M2}
GLUCOSE SERPL-MCNC: 102 MG/DL (ref 70–99)
HCT VFR BLD AUTO: 43.1 % (ref 40.5–52.5)
HGB BLD-MCNC: 14.5 G/DL (ref 13.5–17.5)
INR PPP: 1.08 (ref 0.85–1.15)
MCH RBC QN AUTO: 31.9 PG (ref 26–34)
MCHC RBC AUTO-ENTMCNC: 33.7 G/DL (ref 31–36)
MCV RBC AUTO: 94.6 FL (ref 80–100)
PLATELET # BLD AUTO: 173 K/UL (ref 135–450)
PMV BLD AUTO: 8.5 FL (ref 5–10.5)
POTASSIUM SERPL-SCNC: 4.1 MMOL/L (ref 3.5–5.1)
PROTHROMBIN TIME: 14.2 SEC (ref 11.9–14.9)
RBC # BLD AUTO: 4.55 M/UL (ref 4.2–5.9)
SODIUM SERPL-SCNC: 143 MMOL/L (ref 136–145)
WBC # BLD AUTO: 6.3 K/UL (ref 4–11)

## 2024-10-21 NOTE — FLOWSHEET NOTE
Called patient about procedure. Told to be here at 0630 for procedure at 0800  Must be NPO after midnight but can take morning medication with sips of water. Patient stated they stopped  blood thinners after 10/21 dose prior to procedure as directed by the office. Told to have a responsible adult with them to take them home and stay with them afterwards, if they do not get admitted to hospital. Also, to bring a current list of medications. No other questions or concerns.

## 2024-10-22 ENCOUNTER — HOSPITAL ENCOUNTER (OUTPATIENT)
Age: 54
Setting detail: OUTPATIENT SURGERY
Discharge: HOME | End: 2024-10-22
Attending: INTERNAL MEDICINE | Admitting: INTERNAL MEDICINE
Payer: COMMERCIAL

## 2024-10-22 ENCOUNTER — ANESTHESIA (OUTPATIENT)
Dept: INTERVENTIONAL RADIOLOGY/VASCULAR | Age: 54
End: 2024-10-22
Payer: COMMERCIAL

## 2024-10-22 VITALS
SYSTOLIC BLOOD PRESSURE: 178 MMHG | WEIGHT: 289 LBS | DIASTOLIC BLOOD PRESSURE: 111 MMHG | RESPIRATION RATE: 23 BRPM | BODY MASS INDEX: 41.37 KG/M2 | TEMPERATURE: 97 F | OXYGEN SATURATION: 95 % | HEIGHT: 70 IN | HEART RATE: 88 BPM

## 2024-10-22 DIAGNOSIS — I48.19 PERSISTENT ATRIAL FIBRILLATION (HCC): ICD-10-CM

## 2024-10-22 LAB
ECHO BSA: 2.54 M2
POC ACT LR: 218 SEC
POC ACT LR: 275 SEC
POC ACT LR: 311 SEC
POC ACT LR: 323 SEC
POC ACT LR: 340 SEC
POC ACT LR: >400 SEC

## 2024-10-22 PROCEDURE — C1760 CLOSURE DEV, VASC: HCPCS | Performed by: INTERNAL MEDICINE

## 2024-10-22 PROCEDURE — 2720000010 HC SURG SUPPLY STERILE: Performed by: INTERNAL MEDICINE

## 2024-10-22 PROCEDURE — 2500000003 HC RX 250 WO HCPCS

## 2024-10-22 PROCEDURE — 6360000002 HC RX W HCPCS: Performed by: ANESTHESIOLOGY

## 2024-10-22 PROCEDURE — 6370000000 HC RX 637 (ALT 250 FOR IP): Performed by: INTERNAL MEDICINE

## 2024-10-22 PROCEDURE — 93622 COMP EP EVAL L VENTR PAC&REC: CPT | Performed by: INTERNAL MEDICINE

## 2024-10-22 PROCEDURE — 85347 COAGULATION TIME ACTIVATED: CPT

## 2024-10-22 PROCEDURE — C1893 INTRO/SHEATH, FIXED,NON-PEEL: HCPCS | Performed by: INTERNAL MEDICINE

## 2024-10-22 PROCEDURE — 6360000002 HC RX W HCPCS

## 2024-10-22 PROCEDURE — C1759 CATH, INTRA ECHOCARDIOGRAPHY: HCPCS | Performed by: INTERNAL MEDICINE

## 2024-10-22 PROCEDURE — C1766 INTRO/SHEATH,STRBLE,NON-PEEL: HCPCS | Performed by: INTERNAL MEDICINE

## 2024-10-22 PROCEDURE — 2709999900 HC NON-CHARGEABLE SUPPLY: Performed by: INTERNAL MEDICINE

## 2024-10-22 PROCEDURE — 6360000002 HC RX W HCPCS: Performed by: INTERNAL MEDICINE

## 2024-10-22 PROCEDURE — 3700000000 HC ANESTHESIA ATTENDED CARE: Performed by: INTERNAL MEDICINE

## 2024-10-22 PROCEDURE — 7100000001 HC PACU RECOVERY - ADDTL 15 MIN: Performed by: INTERNAL MEDICINE

## 2024-10-22 PROCEDURE — C1769 GUIDE WIRE: HCPCS | Performed by: INTERNAL MEDICINE

## 2024-10-22 PROCEDURE — C1733 CATH, EP, OTHR THAN COOL-TIP: HCPCS | Performed by: INTERNAL MEDICINE

## 2024-10-22 PROCEDURE — C1894 INTRO/SHEATH, NON-LASER: HCPCS | Performed by: INTERNAL MEDICINE

## 2024-10-22 PROCEDURE — 7100000011 HC PHASE II RECOVERY - ADDTL 15 MIN: Performed by: INTERNAL MEDICINE

## 2024-10-22 PROCEDURE — 7100000010 HC PHASE II RECOVERY - FIRST 15 MIN: Performed by: INTERNAL MEDICINE

## 2024-10-22 PROCEDURE — 2580000003 HC RX 258

## 2024-10-22 PROCEDURE — 3700000001 HC ADD 15 MINUTES (ANESTHESIA): Performed by: INTERNAL MEDICINE

## 2024-10-22 PROCEDURE — 93656 COMPRE EP EVAL ABLTJ ATR FIB: CPT | Performed by: INTERNAL MEDICINE

## 2024-10-22 PROCEDURE — C1732 CATH, EP, DIAG/ABL, 3D/VECT: HCPCS | Performed by: INTERNAL MEDICINE

## 2024-10-22 PROCEDURE — 7100000000 HC PACU RECOVERY - FIRST 15 MIN: Performed by: INTERNAL MEDICINE

## 2024-10-22 RX ORDER — FENTANYL CITRATE 50 UG/ML
INJECTION, SOLUTION INTRAMUSCULAR; INTRAVENOUS
Status: DISCONTINUED | OUTPATIENT
Start: 2024-10-22 | End: 2024-10-22 | Stop reason: SDUPTHER

## 2024-10-22 RX ORDER — MIDAZOLAM HYDROCHLORIDE 1 MG/ML
INJECTION, SOLUTION INTRAMUSCULAR; INTRAVENOUS
Status: DISCONTINUED | OUTPATIENT
Start: 2024-10-22 | End: 2024-10-22 | Stop reason: SDUPTHER

## 2024-10-22 RX ORDER — OXYCODONE HYDROCHLORIDE 5 MG/1
5 TABLET ORAL
Status: DISCONTINUED | OUTPATIENT
Start: 2024-10-22 | End: 2024-10-22 | Stop reason: HOSPADM

## 2024-10-22 RX ORDER — MIDAZOLAM HYDROCHLORIDE 1 MG/ML
INJECTION, SOLUTION INTRAMUSCULAR; INTRAVENOUS
Status: COMPLETED
Start: 2024-10-22 | End: 2024-10-22

## 2024-10-22 RX ORDER — ONDANSETRON 2 MG/ML
INJECTION INTRAMUSCULAR; INTRAVENOUS
Status: DISCONTINUED | OUTPATIENT
Start: 2024-10-22 | End: 2024-10-22 | Stop reason: SDUPTHER

## 2024-10-22 RX ORDER — SODIUM CHLORIDE 0.9 % (FLUSH) 0.9 %
5-40 SYRINGE (ML) INJECTION EVERY 12 HOURS SCHEDULED
Status: DISCONTINUED | OUTPATIENT
Start: 2024-10-22 | End: 2024-10-22 | Stop reason: HOSPADM

## 2024-10-22 RX ORDER — IPRATROPIUM BROMIDE AND ALBUTEROL SULFATE 2.5; .5 MG/3ML; MG/3ML
1 SOLUTION RESPIRATORY (INHALATION)
Status: DISCONTINUED | OUTPATIENT
Start: 2024-10-22 | End: 2024-10-22 | Stop reason: HOSPADM

## 2024-10-22 RX ORDER — SUCCINYLCHOLINE CHLORIDE 20 MG/ML
INJECTION INTRAMUSCULAR; INTRAVENOUS
Status: DISCONTINUED | OUTPATIENT
Start: 2024-10-22 | End: 2024-10-22 | Stop reason: SDUPTHER

## 2024-10-22 RX ORDER — SODIUM CHLORIDE 9 MG/ML
INJECTION, SOLUTION INTRAVENOUS PRN
Status: DISCONTINUED | OUTPATIENT
Start: 2024-10-22 | End: 2024-10-22 | Stop reason: HOSPADM

## 2024-10-22 RX ORDER — VECURONIUM BROMIDE 1 MG/ML
INJECTION, POWDER, LYOPHILIZED, FOR SOLUTION INTRAVENOUS
Status: DISCONTINUED | OUTPATIENT
Start: 2024-10-22 | End: 2024-10-22 | Stop reason: SDUPTHER

## 2024-10-22 RX ORDER — PROTAMINE SULFATE 10 MG/ML
INJECTION, SOLUTION INTRAVENOUS
Status: DISCONTINUED | OUTPATIENT
Start: 2024-10-22 | End: 2024-10-22 | Stop reason: SDUPTHER

## 2024-10-22 RX ORDER — SODIUM CHLORIDE, SODIUM LACTATE, POTASSIUM CHLORIDE, CALCIUM CHLORIDE 600; 310; 30; 20 MG/100ML; MG/100ML; MG/100ML; MG/100ML
INJECTION, SOLUTION INTRAVENOUS
Status: DISCONTINUED | OUTPATIENT
Start: 2024-10-22 | End: 2024-10-22 | Stop reason: SDUPTHER

## 2024-10-22 RX ORDER — LORAZEPAM 2 MG/ML
0.5 INJECTION INTRAMUSCULAR
Status: COMPLETED | OUTPATIENT
Start: 2024-10-22 | End: 2024-10-22

## 2024-10-22 RX ORDER — GLYCOPYRROLATE 0.2 MG/ML
INJECTION INTRAMUSCULAR; INTRAVENOUS
Status: DISCONTINUED | OUTPATIENT
Start: 2024-10-22 | End: 2024-10-22 | Stop reason: SDUPTHER

## 2024-10-22 RX ORDER — SODIUM CHLORIDE 0.9 % (FLUSH) 0.9 %
5-40 SYRINGE (ML) INJECTION PRN
Status: DISCONTINUED | OUTPATIENT
Start: 2024-10-22 | End: 2024-10-22 | Stop reason: HOSPADM

## 2024-10-22 RX ORDER — SODIUM CHLORIDE 9 MG/ML
INJECTION, SOLUTION INTRAVENOUS
Status: DISCONTINUED | OUTPATIENT
Start: 2024-10-22 | End: 2024-10-22 | Stop reason: SDUPTHER

## 2024-10-22 RX ORDER — HEPARIN SODIUM 1000 [USP'U]/ML
INJECTION, SOLUTION INTRAVENOUS; SUBCUTANEOUS
Status: DISCONTINUED | OUTPATIENT
Start: 2024-10-22 | End: 2024-10-22 | Stop reason: SDUPTHER

## 2024-10-22 RX ORDER — LIDOCAINE HYDROCHLORIDE 20 MG/ML
INJECTION, SOLUTION EPIDURAL; INFILTRATION; INTRACAUDAL; PERINEURAL
Status: DISCONTINUED | OUTPATIENT
Start: 2024-10-22 | End: 2024-10-22 | Stop reason: SDUPTHER

## 2024-10-22 RX ORDER — FENTANYL CITRATE 50 UG/ML
INJECTION, SOLUTION INTRAMUSCULAR; INTRAVENOUS
Status: COMPLETED
Start: 2024-10-22 | End: 2024-10-22

## 2024-10-22 RX ORDER — HYDROMORPHONE HYDROCHLORIDE 1 MG/ML
0.5 INJECTION, SOLUTION INTRAMUSCULAR; INTRAVENOUS; SUBCUTANEOUS EVERY 5 MIN PRN
Status: DISCONTINUED | OUTPATIENT
Start: 2024-10-22 | End: 2024-10-22 | Stop reason: HOSPADM

## 2024-10-22 RX ORDER — ONDANSETRON 2 MG/ML
4 INJECTION INTRAMUSCULAR; INTRAVENOUS
Status: DISCONTINUED | OUTPATIENT
Start: 2024-10-22 | End: 2024-10-22 | Stop reason: HOSPADM

## 2024-10-22 RX ORDER — FENTANYL CITRATE 50 UG/ML
25 INJECTION, SOLUTION INTRAMUSCULAR; INTRAVENOUS EVERY 5 MIN PRN
Status: DISCONTINUED | OUTPATIENT
Start: 2024-10-22 | End: 2024-10-22 | Stop reason: HOSPADM

## 2024-10-22 RX ORDER — PROCHLORPERAZINE EDISYLATE 5 MG/ML
5 INJECTION INTRAMUSCULAR; INTRAVENOUS
Status: DISCONTINUED | OUTPATIENT
Start: 2024-10-22 | End: 2024-10-22 | Stop reason: HOSPADM

## 2024-10-22 RX ORDER — ROCURONIUM BROMIDE 10 MG/ML
INJECTION, SOLUTION INTRAVENOUS
Status: DISCONTINUED | OUTPATIENT
Start: 2024-10-22 | End: 2024-10-22 | Stop reason: SDUPTHER

## 2024-10-22 RX ORDER — PROPOFOL 10 MG/ML
INJECTION, EMULSION INTRAVENOUS
Status: DISCONTINUED | OUTPATIENT
Start: 2024-10-22 | End: 2024-10-22 | Stop reason: SDUPTHER

## 2024-10-22 RX ORDER — DIPHENHYDRAMINE HYDROCHLORIDE 50 MG/ML
12.5 INJECTION INTRAMUSCULAR; INTRAVENOUS
Status: DISCONTINUED | OUTPATIENT
Start: 2024-10-22 | End: 2024-10-22 | Stop reason: HOSPADM

## 2024-10-22 RX ORDER — NALOXONE HYDROCHLORIDE 0.4 MG/ML
INJECTION, SOLUTION INTRAMUSCULAR; INTRAVENOUS; SUBCUTANEOUS
Status: DISCONTINUED
Start: 2024-10-22 | End: 2024-10-22 | Stop reason: HOSPADM

## 2024-10-22 RX ORDER — LABETALOL HYDROCHLORIDE 5 MG/ML
10 INJECTION, SOLUTION INTRAVENOUS
Status: DISCONTINUED | OUTPATIENT
Start: 2024-10-22 | End: 2024-10-22 | Stop reason: HOSPADM

## 2024-10-22 RX ORDER — HYDRALAZINE HYDROCHLORIDE 20 MG/ML
10 INJECTION INTRAMUSCULAR; INTRAVENOUS ONCE
Status: COMPLETED | OUTPATIENT
Start: 2024-10-22 | End: 2024-10-22

## 2024-10-22 RX ORDER — MORPHINE SULFATE 2 MG/ML
2 INJECTION, SOLUTION INTRAMUSCULAR; INTRAVENOUS ONCE
Status: COMPLETED | OUTPATIENT
Start: 2024-10-22 | End: 2024-10-22

## 2024-10-22 RX ORDER — NALOXONE HYDROCHLORIDE 0.4 MG/ML
INJECTION, SOLUTION INTRAMUSCULAR; INTRAVENOUS; SUBCUTANEOUS PRN
Status: DISCONTINUED | OUTPATIENT
Start: 2024-10-22 | End: 2024-10-22 | Stop reason: HOSPADM

## 2024-10-22 RX ORDER — LORAZEPAM 2 MG/ML
INJECTION INTRAMUSCULAR
Status: DISCONTINUED
Start: 2024-10-22 | End: 2024-10-22 | Stop reason: HOSPADM

## 2024-10-22 RX ADMIN — HEPARIN SODIUM 5000 UNITS: 1000 INJECTION INTRAVENOUS; SUBCUTANEOUS at 09:12

## 2024-10-22 RX ADMIN — GLYCOPYRROLATE 0.4 MG: 0.2 INJECTION INTRAMUSCULAR; INTRAVENOUS at 10:02

## 2024-10-22 RX ADMIN — PROPOFOL 200 MG: 10 INJECTION, EMULSION INTRAVENOUS at 07:52

## 2024-10-22 RX ADMIN — SUCCINYLCHOLINE CHLORIDE 160 MG: 20 INJECTION, SOLUTION INTRAMUSCULAR; INTRAVENOUS at 07:52

## 2024-10-22 RX ADMIN — SODIUM CHLORIDE, SODIUM LACTATE, POTASSIUM CHLORIDE, AND CALCIUM CHLORIDE: .6; .31; .03; .02 INJECTION, SOLUTION INTRAVENOUS at 07:46

## 2024-10-22 RX ADMIN — VECURONIUM BROMIDE 5 MG: 1 INJECTION, POWDER, LYOPHILIZED, FOR SOLUTION INTRAVENOUS at 09:25

## 2024-10-22 RX ADMIN — FENTANYL CITRATE 100 MCG: 50 INJECTION, SOLUTION INTRAMUSCULAR; INTRAVENOUS at 07:51

## 2024-10-22 RX ADMIN — HEPARIN SODIUM 2000 UNITS: 1000 INJECTION INTRAVENOUS; SUBCUTANEOUS at 10:08

## 2024-10-22 RX ADMIN — LIDOCAINE HYDROCHLORIDE 100 MG: 20 INJECTION, SOLUTION EPIDURAL; INFILTRATION; INTRACAUDAL; PERINEURAL at 07:52

## 2024-10-22 RX ADMIN — ROCURONIUM BROMIDE 30 MG: 10 INJECTION, SOLUTION INTRAVENOUS at 09:58

## 2024-10-22 RX ADMIN — SODIUM CHLORIDE: 9 INJECTION, SOLUTION INTRAVENOUS at 09:10

## 2024-10-22 RX ADMIN — VECURONIUM BROMIDE 10 MG: 1 INJECTION, POWDER, LYOPHILIZED, FOR SOLUTION INTRAVENOUS at 08:00

## 2024-10-22 RX ADMIN — LORAZEPAM 0.5 MG: 2 INJECTION INTRAMUSCULAR; INTRAVENOUS at 11:23

## 2024-10-22 RX ADMIN — HEPARIN SODIUM 10000 UNITS: 1000 INJECTION INTRAVENOUS; SUBCUTANEOUS at 08:21

## 2024-10-22 RX ADMIN — HYDRALAZINE HYDROCHLORIDE 10 MG: 20 INJECTION, SOLUTION INTRAMUSCULAR; INTRAVENOUS at 12:17

## 2024-10-22 RX ADMIN — VECURONIUM BROMIDE 5 MG: 1 INJECTION, POWDER, LYOPHILIZED, FOR SOLUTION INTRAVENOUS at 08:58

## 2024-10-22 RX ADMIN — HEPARIN SODIUM 5000 UNITS: 1000 INJECTION INTRAVENOUS; SUBCUTANEOUS at 08:44

## 2024-10-22 RX ADMIN — SUGAMMADEX 200 MG: 100 INJECTION, SOLUTION INTRAVENOUS at 10:38

## 2024-10-22 RX ADMIN — APIXABAN 5 MG: 5 TABLET, FILM COATED ORAL at 16:18

## 2024-10-22 RX ADMIN — HEPARIN SODIUM 8000 UNITS: 1000 INJECTION INTRAVENOUS; SUBCUTANEOUS at 08:58

## 2024-10-22 RX ADMIN — GLYCOPYRROLATE 0.4 MG: 0.2 INJECTION INTRAMUSCULAR; INTRAVENOUS at 08:56

## 2024-10-22 RX ADMIN — GLYCOPYRROLATE 0.4 MG: 0.2 INJECTION INTRAMUSCULAR; INTRAVENOUS at 08:10

## 2024-10-22 RX ADMIN — ONDANSETRON 4 MG: 2 INJECTION INTRAMUSCULAR; INTRAVENOUS at 07:57

## 2024-10-22 RX ADMIN — MORPHINE SULFATE 2 MG: 2 INJECTION, SOLUTION INTRAMUSCULAR; INTRAVENOUS at 12:16

## 2024-10-22 RX ADMIN — PROTAMINE SULFATE 40 MG: 10 INJECTION, SOLUTION INTRAVENOUS at 10:29

## 2024-10-22 RX ADMIN — HYDROMORPHONE HYDROCHLORIDE 0.5 MG: 1 INJECTION, SOLUTION INTRAMUSCULAR; INTRAVENOUS; SUBCUTANEOUS at 11:10

## 2024-10-22 RX ADMIN — HEPARIN SODIUM 2000 UNITS: 1000 INJECTION INTRAVENOUS; SUBCUTANEOUS at 09:30

## 2024-10-22 RX ADMIN — MIDAZOLAM HYDROCHLORIDE 2 MG: 2 INJECTION, SOLUTION INTRAMUSCULAR; INTRAVENOUS at 07:51

## 2024-10-22 ASSESSMENT — PAIN DESCRIPTION - ORIENTATION
ORIENTATION: RIGHT

## 2024-10-22 ASSESSMENT — PAIN DESCRIPTION - LOCATION
LOCATION: HAND

## 2024-10-22 ASSESSMENT — PAIN DESCRIPTION - DESCRIPTORS
DESCRIPTORS: NUMBNESS;TINGLING
DESCRIPTORS: ACHING

## 2024-10-22 ASSESSMENT — PAIN - FUNCTIONAL ASSESSMENT
PAIN_FUNCTIONAL_ASSESSMENT: ACTIVITIES ARE NOT PREVENTED
PAIN_FUNCTIONAL_ASSESSMENT: 0-10

## 2024-10-22 ASSESSMENT — PAIN SCALES - GENERAL
PAINLEVEL_OUTOF10: 6
PAINLEVEL_OUTOF10: 7
PAINLEVEL_OUTOF10: 4

## 2024-10-22 NOTE — ANESTHESIA POSTPROCEDURE EVALUATION
Department of Anesthesiology  Postprocedure Note    Patient: Theo Tariq  MRN: [id number]  YOB: 1970  Date of evaluation: 10/22/2024    Procedure Summary       Date: 10/22/24 Room / Location: [facility] SPECIAL PROCEDURES 3 / [facility] CARDIAC CATH LAB    Anesthesia Start: 0746 Anesthesia Stop: 1114    Procedure: Ablation A-fib w complete ep study Diagnosis:       Persistent atrial fibrillation (HCC)      (Persistent atrial fibrillation (HCC) [I48.19])    Providers: Alley Crawford MD Responsible Provider: Speedy Rod MD    Anesthesia Type: general ASA Status: 3            Anesthesia Type: No value filed.    Malorie Phase I: Malorie Score: 10    Malorie Phase II:      Anesthesia Post Evaluation    Patient location during evaluation: PACU  Patient participation: complete - patient participated  Level of consciousness: awake  Airway patency: patent  Nausea & Vomiting: no nausea and no vomiting  Cardiovascular status: blood pressure returned to baseline and hemodynamically stable  Respiratory status: acceptable  Hydration status: euvolemic  Multimodal analgesia pain management approach  Pain management: adequate    There were no known notable events for this encounter.

## 2024-10-22 NOTE — PROGRESS NOTES
Report given to cath lab Danilo Palacios at bedside.   Patient awake- O2 saturation 97% on room air.     Groins sites intact- no bruising or oozing.   Good pedal pulses bilateral +2.   Patient remains supine for 4 hours bedrest.     Patient transferred back to cath lab for remaining recovery period bay 2.

## 2024-10-22 NOTE — PROGRESS NOTES
From Cath LAB to PACU 17.   VS stable.   SR on monitor.   Report received from Cath LAB RN Danilo at bedside.   Patient complains of pain \" stabbing\" and numbness in right hand, hx of neuropathy.   Danilo JAMIL called and let MD Dr. Crawford aware of patients complaints of hand discomfort. Treated with narcotic dilaudid IV as ordered.   Left and Right groin with sheaths venous in place. No bruising or swelling present.   Good pedal pulses present.   Bedrest for 4 hours.

## 2024-10-22 NOTE — ANESTHESIA PRE PROCEDURE
Department of Anesthesiology  Preprocedure Note       Name:  Theo Tariq   Age:  54 y.o.  :  1970                                          MRN:  [id number]         Date:  10/22/2024      Surgeon: Surgeon(s):  Alley Crawford MD    Procedure: Procedure(s):  Ablation A-fib w complete ep study    Medications prior to admission:   Prior to Admission medications    Medication Sig Start Date End Date Taking? Authorizing Provider   flecainide (TAMBOCOR) 100 MG tablet Take 1 tablet by mouth 2 times daily 24   Marcelle Singletary APRN - CNP   apixaban (ELIQUIS) 5 MG TABS tablet Take 1 tablet by mouth 2 times daily 24   Marcelle Singletary APRN - CNP   metoprolol tartrate (LOPRESSOR) 25 MG tablet Take 1 tablet by mouth 2 times daily 24   Marcelle Singletary APRN - CNP   atorvastatin (LIPITOR) 40 MG tablet Take 1 tablet by mouth daily 24   Shyam Hernandez MD   amphetamine-dextroamphetamine (ADDERALL) 20 MG tablet Take 1 tablet by mouth daily. Max Daily Amount: 20 mg  Patient not taking: Reported on 2024    ProviderCassandra MD   sertraline (ZOLOFT) 50 MG tablet Take 1.5 tablets by mouth daily 3/24/22   ProviderCassandra MD       Current medications:    Current Facility-Administered Medications   Medication Dose Route Frequency Provider Last Rate Last Admin   • fentaNYL (SUBLIMAZE) 100 MCG/2ML injection            • midazolam (VERSED) 2 MG/2ML injection            • sugammadex (BRIDION) 200 MG/2ML injection                Allergies:  No Known Allergies    Problem List:    Patient Active Problem List   Diagnosis Code   • Essential hypertension I10   • Sleep apnea G47.30   • Elevated liver enzymes R74.8   • Attention deficit disorder (ADD) without hyperactivity F98.8   • Carpal tunnel syndrome, right G56.01   • Obesity (BMI 30.0-34.9) E66.811   • Persistent atrial fibrillation (HCC) I48.19       Past Medical History:        Diagnosis Date   • Family history of ischemic heart disease

## 2024-10-22 NOTE — INTERVAL H&P NOTE
Update History & Physical    The patient's History and Physical of September 30, was reviewed with the patient and I examined the patient. There was no change. The surgical site was confirmed by the patient and me.     Plan: The risks, benefits, expected outcome, and alternative to the recommended procedure have been discussed with the patient. Patient understands and wants to proceed with the procedure.     Electronically signed by Alley Crawford MD on 10/22/2024 at 7:35 AM

## 2024-10-22 NOTE — DISCHARGE INSTRUCTIONS
site care:       You may remove the dressing to the groin and shower after 24 hours.  Please do not submerge in water for at least 1 week or until the site heals completely.     After your shower you may place a band aid to the site until it heals.     It is normal to feel a small lump under the groin site after the procedure. The collagen plugs used to close the puncture site will dissolve over 30 days.    If the site is oozing or bleeding slightly, place a small bandage over it to protect your clothes. Change the bandage daily, and more often if it gets wet or dirty. Once the site has stopped oozing, you may leave it uncovered.     It is normal to have a bruise and soreness where the ablation catheter went in. Draw a line with a pen around the edges of the bruise. This will show you if the bruise starts to get bigger. Gravity may cause the bruise color to travel down your leg. If this happens, call your caregiver.     If the catheter site starts to bleed, use your hand to put pressure on the bandage. If you do not have a bandage, use a clean cloth to hold pressure over the puncture site. It is better if someone else holds pressure for you. While holding pressure, call your caregiver. Hold the pressure for 30 minutes, even after the bleeding has stopped. After the bleeding has stopped, lie flat for at least an hour. If the bleeding does not stop within 15 minutes of holding pressure, go to the nearest hospital or clinic. Do not walk, and do not drive yourself. If you continue to bleed, call 911.      Diet:    Food and liquids: You may eat your usual diet when you get home. Drink plenty of liquids. Most people should drink at least eight (8-ounce) glasses of water each day. Limit the amount of caffeine you drink such as coffee, tea, and soda. Do not drink alcohol for 24 hours after the test. Follow your caregiver's advice if you need to change the amount of liquids that you drink.     Please avoid spicy foods for 1

## 2024-10-22 NOTE — PROGRESS NOTES
Left and Right groin with sheaths in place- no bruising or swelling present.   +2 pedal pulses.     Ativan IV given for anxiety.   Strict Bedrest    @1125 Dr. Rod at bedside to evaluate pain in right hand.

## 2024-10-23 ENCOUNTER — TELEPHONE (OUTPATIENT)
Dept: CARDIOLOGY CLINIC | Age: 54
End: 2024-10-23

## 2024-10-23 NOTE — TELEPHONE ENCOUNTER
Post Ablation Follow up Phone Call    Date of Procedure: 10/22/24    Procedure: AF ablation    Date of Call: 10/23/24    Catheter Ablation  Sore throat or difficulty swallowing? (This is normal for a couple of days post procedure - if it continues, we want to know).    Ongoing CP? (This is normal for a couple of days post procedure - if it continues, we want to know)    Ongoing SOB? Any s/s of heart failure? Swelling in feet ankles or abdomen? Unable to lie flat in bed? SOB with exertion or all the time?    How does the groin look? Any swelling, bleeding or drainage? (A small lump is ok.)    Remind no heavy lifting >10# for 7 days post procedure.     Make sure they miss NO dose of blood thinners if they are on one.    Make sure they filled any medications that were prescribed (Protonix, Lasix, flecainide etc.)    Remind of follow up appointment.

## 2024-10-25 ENCOUNTER — TELEPHONE (OUTPATIENT)
Dept: CARDIOLOGY CLINIC | Age: 54
End: 2024-10-25

## 2024-10-25 NOTE — TELEPHONE ENCOUNTER
The patient's emergency contact Leena called stating she feels the patient's s/p post f/u appt for afib Abl that was done on 10/22/24 appointments are scheduled out to far. According to Leena Spicer told her the patient should be seen in 1 week with an EP NP and again in 6 weeks by him. Leena is not on the patient's communication form. Please advise [phone]

## 2024-10-28 NOTE — PROGRESS NOTES
[facility]   Cardiac Consultation    Referring Provider:  Suzette Vela, APRN - CNP     Chief Complaint   Patient presents with    Atrial Fibrillation     AF ablation f/u       HPI:  Theo Tariq is a 54 y.o. male with PMH significant for seizures, HTN, HLD, YANI, obesity, cardiac CTA (5/2023) showed normal coronaries, reported irregular heartbeats for years. New onset AF (5/2024), placed on metoprolol and Eliquis, echo (6/2024) showed preserved LVEF with LAE, 7 day CAM (6/2024) showed 100% AF RVR, s/p DCCV with ERAF within several min (7/15/24), placed on flecainide by Dr. Hernandez (7/2024). S/p RFA/PVI of AF (10/22/24, Dr. Spicer).    Today, he is here for 1 week follow up post AF ablation. ECG today shows SB with stable QRS. He has been feeling very well since the ablation. Denies SOB, palpitations, chest discomfort, eating difficulties, odynophagia, fever or chills since the ablation. He uses AdexLink to check his rhythm and denies recurrence of AF since the ablation. His biggest issue immediately after the ablation was urinary retention for several days, but mostly back to baseline now. When he urinates now, he stutters at the start and the end of his flow. He has started intermittent fasting and eating a low carb diet. He is concerned that his BP has been running higher since the ablation. At home, he has been running 140s/70s. He was on amlodipine prior to finding AF. After being diagnosed with AF, he was switched from amlodipine to metoprolol.    Past Medical History:   has a past medical history of Family history of ischemic heart disease and Seizures (HCC).    Surgical History:   has a past surgical history that includes ep device procedure (N/A, 10/22/2024).     Social History:   reports that he quit smoking about 12 years ago. His smoking use included cigarettes. He has never used smokeless tobacco. He reports that he does not drink alcohol and does not use drugs.

## 2024-10-28 NOTE — TELEPHONE ENCOUNTER
Called pt and informed him about an availability with Dr Katz for tomorrow at 10am and they said they would be able to do it.   So the patient is now scheduled to see Dr Katz for the 29th.    Thanks

## 2024-10-28 NOTE — TELEPHONE ENCOUNTER
Called pt and talked with Leena per patient. They would like to see the NP for the 1 week follow up per Dr Spicer after the ablation on the 22nd of October.They were displeased with the fact that the NP appt is scheduled for 11th of November with FW. Informed them that Jenifer CONLEY has an appt slot available today at 4:30 to which Leena said the pt is not upto it for coming in today. Informed her that we will be asking CG if she can see them tomorrow at 4:30 if not then the nurses would be calling them to check how the patient was doing till the 11th when FW would be seeing them in person.    Thanks

## 2024-10-29 ENCOUNTER — OFFICE VISIT (OUTPATIENT)
Dept: CARDIOLOGY CLINIC | Age: 54
End: 2024-10-29
Payer: COMMERCIAL

## 2024-10-29 VITALS
WEIGHT: 285.6 LBS | DIASTOLIC BLOOD PRESSURE: 92 MMHG | HEART RATE: 57 BPM | BODY MASS INDEX: 41.57 KG/M2 | SYSTOLIC BLOOD PRESSURE: 156 MMHG

## 2024-10-29 DIAGNOSIS — E66.09 OBESITY DUE TO EXCESS CALORIES WITHOUT SERIOUS COMORBIDITY, UNSPECIFIED CLASS: ICD-10-CM

## 2024-10-29 DIAGNOSIS — G47.33 OSA (OBSTRUCTIVE SLEEP APNEA): ICD-10-CM

## 2024-10-29 DIAGNOSIS — I10 ESSENTIAL HYPERTENSION: ICD-10-CM

## 2024-10-29 DIAGNOSIS — I48.19 PERSISTENT ATRIAL FIBRILLATION (HCC): Primary | ICD-10-CM

## 2024-10-29 PROCEDURE — 3080F DIAST BP >= 90 MM HG: CPT | Performed by: INTERNAL MEDICINE

## 2024-10-29 PROCEDURE — 3077F SYST BP >= 140 MM HG: CPT | Performed by: INTERNAL MEDICINE

## 2024-10-29 PROCEDURE — 99214 OFFICE O/P EST MOD 30 MIN: CPT | Performed by: INTERNAL MEDICINE

## 2024-10-29 PROCEDURE — 93000 ELECTROCARDIOGRAM COMPLETE: CPT | Performed by: INTERNAL MEDICINE

## 2024-10-29 RX ORDER — AMLODIPINE BESYLATE 5 MG/1
5 TABLET ORAL DAILY
Qty: 30 TABLET | Refills: 5 | Status: SHIPPED | OUTPATIENT
Start: 2024-10-29

## 2024-10-29 RX ORDER — SERTRALINE HYDROCHLORIDE 25 MG/1
75 TABLET, FILM COATED ORAL DAILY
COMMUNITY
Start: 2024-09-30

## 2025-01-20 ENCOUNTER — TELEPHONE (OUTPATIENT)
Dept: CARDIOLOGY CLINIC | Age: 55
End: 2025-01-20

## 2025-01-20 NOTE — TELEPHONE ENCOUNTER
Patient's partner Leena would like a call back from [facility] to see if the patient can been seen sooner for a follow up from ablation.     Can reach Leena at [phone]

## 2025-01-22 NOTE — PROGRESS NOTES
[facility]   Cardiac Electrophysiology Consultation   Date: 2/4/2025  Reason for Consultation:   Consult Requesting Physician: No att. providers found     Chief Complaint:   Chief Complaint   Patient presents with    Atrial Fibrillation      HPI: Theo Tariq is a 54 y.o. male referred by Dr. Hernandez for consideration of ablation. PMH significant for seizures, HTN, HLD, YANI, obesity, cardiac CTA (5/2023) showed normal coronaries, reported irregular heartbeats for years, new onset AF (5/2024), placed on metoprolol and Eliquis, echo (6/2024) showed preserved LVEF with LAE, 7 day CAM (6/2024) showed 100% AF RVR, s/p DCCV with ERAF within several min (7/15/24), placed on flecainide, s/p RFA of AF (10/22/24, myself).     Interval History:   Today, he is here for f/u post AF ablation, presenting in SR with stable QRS. He has been doing very well since the ablation with significant improvement in energy levels. Denies any symptomatic recurrence of AF. Denies SOB, palpitations, chest discomfort, eating difficulties, odynophagia, fever or chills since the ablation. He had lost weight by doing intermittent fasting and low carb diet. At home, his BP has been running 140-150 / 70-80.    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    Assessment:  Persistent AF, on Eliquis, flecainide, Lopressor  S/p AF ablation (10/2024)  LAE, AF likely going on for some time  HTN, stable on Lopressor, amlodipine  HLD, on statin  YANI, compliant with CPAP  Obesity, morbid    Plan:  Stop flecainide and Eliquis  Start ASA 81 mg daily  Discussed lifestyle modifications of weight loss with improved diet and exercise as well as better BP control with goal of 120/80  Increase amlodipine to 10 mg daily  Continue metoprolol 25 mg BID  Continue to monitor for AF with Technitrol  Continue to f/u with Dr. Hernandez for HTN, HLD  Follow up with EP NP in 1 year    Atrial Fibrillation:    BMI    :   Body mass index is 40.91

## 2025-02-04 ENCOUNTER — OFFICE VISIT (OUTPATIENT)
Dept: CARDIOLOGY CLINIC | Age: 55
End: 2025-02-04
Payer: COMMERCIAL

## 2025-02-04 VITALS
DIASTOLIC BLOOD PRESSURE: 80 MMHG | HEIGHT: 69 IN | HEART RATE: 58 BPM | SYSTOLIC BLOOD PRESSURE: 152 MMHG | WEIGHT: 277 LBS | BODY MASS INDEX: 41.03 KG/M2

## 2025-02-04 DIAGNOSIS — E78.5 HYPERLIPIDEMIA LDL GOAL <70: ICD-10-CM

## 2025-02-04 DIAGNOSIS — Z51.81 ENCOUNTER FOR MONITORING FLECAINIDE THERAPY: ICD-10-CM

## 2025-02-04 DIAGNOSIS — Z79.01 ON CONTINUOUS ORAL ANTICOAGULATION: ICD-10-CM

## 2025-02-04 DIAGNOSIS — Z79.899 ENCOUNTER FOR MONITORING FLECAINIDE THERAPY: ICD-10-CM

## 2025-02-04 DIAGNOSIS — I48.19 PERSISTENT ATRIAL FIBRILLATION (HCC): Primary | ICD-10-CM

## 2025-02-04 DIAGNOSIS — G47.33 OSA (OBSTRUCTIVE SLEEP APNEA): ICD-10-CM

## 2025-02-04 DIAGNOSIS — I10 ESSENTIAL HYPERTENSION: ICD-10-CM

## 2025-02-04 DIAGNOSIS — E66.01 OBESITY, MORBID, BMI 40.0-49.9: ICD-10-CM

## 2025-02-04 PROCEDURE — 99214 OFFICE O/P EST MOD 30 MIN: CPT | Performed by: INTERNAL MEDICINE

## 2025-02-04 PROCEDURE — 3079F DIAST BP 80-89 MM HG: CPT | Performed by: INTERNAL MEDICINE

## 2025-02-04 PROCEDURE — 3077F SYST BP >= 140 MM HG: CPT | Performed by: INTERNAL MEDICINE

## 2025-02-04 PROCEDURE — 93000 ELECTROCARDIOGRAM COMPLETE: CPT | Performed by: INTERNAL MEDICINE

## 2025-02-04 RX ORDER — AMLODIPINE BESYLATE 10 MG/1
10 TABLET ORAL DAILY
Qty: 90 TABLET | Refills: 3 | Status: SHIPPED | OUTPATIENT
Start: 2025-02-04

## 2025-02-04 RX ORDER — ASPIRIN 81 MG/1
81 TABLET ORAL DAILY
Qty: 90 TABLET | Refills: 3 | Status: SHIPPED | OUTPATIENT
Start: 2025-02-04

## 2025-02-04 RX ORDER — ATORVASTATIN CALCIUM 40 MG/1
40 TABLET, FILM COATED ORAL DAILY
Qty: 90 TABLET | Refills: 3 | Status: SHIPPED | OUTPATIENT
Start: 2025-02-04

## 2025-06-30 NOTE — PROGRESS NOTES
[facility]     Outpatient Cardiology         Patient Name:  Theo Tariq  Primary Care Physician: Suzette Vela, APRN - CNP  23     Assessment & Plan    Assessment / Plan:     Paroxysmal A-fib status post ablation-no recurrence of palpitations.  YANI-continue using CPAP  Essential hypertension-controlled.  Will change Lopressor to Toprol-XL.  Continue Norvasc.  Stagger blood pressure medications.  Check CMP.  Hyperlipidemia-continue Lipitor.  Check lipid panel.              Chief Complaint:     Chief Complaint   Patient presents with    6 Month Follow-Up       History of Present Illness:       HPI     Theo Tariq is a 55 y.o. male with PMH of HLD, YANI uses CPAP,  HTN and paroxysmal Afib s/p ablation here for a follow up. Previously seen by Dr Hernandez.      Today he reports he is doing well.  No episodes of Afib noted.  Patient denies any chest pain, shortness of breath, palpitations, presyncope or syncope. No TIA. No claudication. No recent hospitalizations    PMH  Past Medical History:   Diagnosis Date    Family history of ischemic heart disease     Seizures (HCC)     5 years ago, no recurrence       PSH  Past Surgical History:   Procedure Laterality Date    EP DEVICE PROCEDURE N/A 10/22/2024    Ablation A-fib w complete ep study performed by Alley Crawford MD at [facility] CARDIAC CATH LAB        Social HIstory  Social History     Tobacco Use    Smoking status: Former     Current packs/day: 0.00     Types: Cigarettes     Quit date: 2011     Years since quittin.6    Smokeless tobacco: Never   Substance Use Topics    Alcohol use: No     Alcohol/week: 4.0 standard drinks of alcohol     Types: 4 Standard drinks or equivalent per week    Drug use: No       Family History  Family History   Problem Relation Age of Onset    Cancer Mother         breast    Diabetes Mother     Stroke Mother     Heart Disease Father         MI at age 45       Allergies   No

## 2025-06-30 NOTE — PATIENT INSTRUCTIONS
Thank you for choosing [facility] for your cardiac care.    During your visit today, we reviewed and confirmed your cardiac medications along with  medication prescribed by your other healthcare team members. Please be sure to discuss any  changes to medication with your providers.    Please bring a list of ALL medications (or the bottles) with you to EVERY appointment.  Also include vitamins and over-the-counter medications.    If you need refills for any cardiac medications, please call your pharmacy and they will reach out to us electronically.    Did your provider order testing today? If yes, then you will receive your results in three  possible ways. You can receive a [employer] message, a phone call, or letter in the mail. Please  note, if you are an active [employer] user, some of your testing will be available within 1-2 days.    Finally, please know that it is good for your heart to exercise and follow a healthy, low-fat diet  as advised by your physician and health care providers.    If you are experiencing a medical emergency, please call 911 immediately.    It's easy to register for a [employer] account if you don't already have one. With a [employer]  account you can manage your health record, view test results, schedule appointments and  more.     Dr. Cox's clinical staff can be reached at the following phone number: [phone]    If any cardiac testing is ordered, please contact central scheduling at [phone] to get your test scheduled.       Take your Norvasc in the morning and the Metoprolol in the evening

## 2025-07-07 ENCOUNTER — OFFICE VISIT (OUTPATIENT)
Dept: CARDIOLOGY CLINIC | Age: 55
End: 2025-07-07
Payer: COMMERCIAL

## 2025-07-07 VITALS
SYSTOLIC BLOOD PRESSURE: 130 MMHG | HEART RATE: 66 BPM | BODY MASS INDEX: 41.64 KG/M2 | DIASTOLIC BLOOD PRESSURE: 80 MMHG | OXYGEN SATURATION: 96 % | WEIGHT: 282 LBS

## 2025-07-07 DIAGNOSIS — I48.0 PAROXYSMAL ATRIAL FIBRILLATION (HCC): ICD-10-CM

## 2025-07-07 DIAGNOSIS — I10 ESSENTIAL HYPERTENSION: ICD-10-CM

## 2025-07-07 DIAGNOSIS — E78.5 HYPERLIPIDEMIA LDL GOAL <70: Primary | ICD-10-CM

## 2025-07-07 PROCEDURE — 99214 OFFICE O/P EST MOD 30 MIN: CPT | Performed by: INTERNAL MEDICINE

## 2025-07-07 PROCEDURE — 3079F DIAST BP 80-89 MM HG: CPT | Performed by: INTERNAL MEDICINE

## 2025-07-07 PROCEDURE — 3075F SYST BP GE 130 - 139MM HG: CPT | Performed by: INTERNAL MEDICINE

## 2025-07-07 RX ORDER — METOPROLOL SUCCINATE 50 MG/1
50 TABLET, EXTENDED RELEASE ORAL DAILY
Qty: 90 TABLET | Refills: 3 | Status: SHIPPED | OUTPATIENT
Start: 2025-07-07

## 2025-07-07 RX ORDER — ATORVASTATIN CALCIUM 40 MG/1
40 TABLET, FILM COATED ORAL DAILY
Qty: 90 TABLET | Refills: 3 | Status: SHIPPED | OUTPATIENT
Start: 2025-07-07

## 2025-07-07 RX ORDER — AMLODIPINE BESYLATE 10 MG/1
10 TABLET ORAL DAILY
Qty: 90 TABLET | Refills: 3 | Status: SHIPPED | OUTPATIENT
Start: 2025-07-07

## (undated) DEVICE — SYSTEM CLOSURE 6-12 FR VEN VASC VASCADE MVP

## (undated) DEVICE — CATHETER EP 7FR L115CM 2-8-2MM SPC TIP 2MM 10 ELECTRD D CRV

## (undated) DEVICE — 1 X VERSACROSS TRANSSEPTAL SHEATH (INCLUDING  1 X J-TIP GUIDEWIRE); 1 X VERSACROSS RF WIRE (INCLUDING 1 X CONNECTOR CABLE (SINGLE USE)); 1 X DISPERSIVE ELECTRODE: Brand: VERSACROSS ACCESS SOLUTION

## (undated) DEVICE — Device

## (undated) DEVICE — SOUNDSTAR REPROC ECO 8F DGNSTC ULTRSND CATH USE GE IMGNG SSTM

## (undated) DEVICE — CATHETER ABLATION PFA LOOP STRL DISP PULSESELECT

## (undated) DEVICE — INTRODUCER SHTH STD 8 FRX11 CM FLX KINK RESIST CANN AVNT +

## (undated) DEVICE — PATCH REF EXT FOR CARTO 3 SYS (EA = 6 PACKS)

## (undated) DEVICE — AMPLATZ EXTRA STIFF WIRE GUIDE: Brand: AMPLATZ

## (undated) DEVICE — INTRODUCER SHTH AD L11CM DIA 9FR STD BLK S STL PERIPH BRACH

## (undated) DEVICE — INTRODUCER SHTH L11CM OD11FR 0.38IN AD VASC W/OUT CRV

## (undated) DEVICE — CATHETER MAP D CRV 3-3-3-3-3 MM SPC GALAXY OCTARAY

## (undated) DEVICE — EP VASCULAR PACK: Brand: MEDLINE INDUSTRIES, INC.

## (undated) DEVICE — DILATOR: Brand: COOK

## (undated) DEVICE — SHEATH CATH L 65.5 CM DIA10 FR SZ 13 MM STEER STRL DISP

## (undated) DEVICE — CABLE ABLATION CATH INTFACE PULSESELECT